# Patient Record
Sex: MALE | Race: WHITE | NOT HISPANIC OR LATINO | Employment: FULL TIME | URBAN - NONMETROPOLITAN AREA
[De-identification: names, ages, dates, MRNs, and addresses within clinical notes are randomized per-mention and may not be internally consistent; named-entity substitution may affect disease eponyms.]

---

## 2017-01-04 ENCOUNTER — OFFICE VISIT (OUTPATIENT)
Dept: OTOLARYNGOLOGY | Facility: OTHER | Age: 57
End: 2017-01-04
Attending: PHYSICIAN ASSISTANT
Payer: COMMERCIAL

## 2017-01-04 VITALS
SYSTOLIC BLOOD PRESSURE: 122 MMHG | WEIGHT: 229 LBS | HEIGHT: 73 IN | TEMPERATURE: 97.5 F | BODY MASS INDEX: 30.35 KG/M2 | DIASTOLIC BLOOD PRESSURE: 70 MMHG | HEART RATE: 58 BPM

## 2017-01-04 DIAGNOSIS — J31.0 CHRONIC RHINITIS: Primary | ICD-10-CM

## 2017-01-04 DIAGNOSIS — J32.4 CHRONIC PANSINUSITIS: ICD-10-CM

## 2017-01-04 DIAGNOSIS — Z98.890 HISTORY OF NASAL SEPTOPLASTY: ICD-10-CM

## 2017-01-04 DIAGNOSIS — J30.89 PERENNIAL ALLERGIC RHINITIS, UNSPECIFIED ALLERGIC RHINITIS TRIGGER: ICD-10-CM

## 2017-01-04 PROCEDURE — 99213 OFFICE O/P EST LOW 20 MIN: CPT | Performed by: PHYSICIAN ASSISTANT

## 2017-01-04 ASSESSMENT — PAIN SCALES - GENERAL: PAINLEVEL: SEVERE PAIN (6)

## 2017-01-04 NOTE — NURSING NOTE
"Chief Complaint   Patient presents with     Allergies     Pt is here for a f/u scit.       Initial /70 mmHg  Pulse 58  Temp(Src) 97.5  F (36.4  C) (Tympanic)  Ht 6' 1\" (1.854 m)  Wt 229 lb (103.874 kg)  BMI 30.22 kg/m2 Estimated body mass index is 30.22 kg/(m^2) as calculated from the following:    Height as of this encounter: 6' 1\" (1.854 m).    Weight as of this encounter: 229 lb (103.874 kg).  BP completed using cuff size: ayanna Newman LPN      "

## 2017-01-04 NOTE — PATIENT INSTRUCTIONS
Continue with nasonex 2 sprays to each nostril daily  You have completed allergy injections    Use sylvia Med rinse- Rinse as needed  If there are concerns or questions, Call [phone] and ask for nurse

## 2017-01-04 NOTE — MR AVS SNAPSHOT
"              After Visit Summary   1/4/2017    Jude Zazueta    MRN: [id number]           Patient Information     Date Of Birth          1960        Visit Information        Provider Department      1/4/2017 8:15 AM Denice Narayan [facility]C [facility]        Today's Diagnoses     Chronic rhinitis    -  1     Perennial allergic rhinitis, unspecified allergic rhinitis trigger         Chronic pansinusitis         History of nasal septoplasty           Care Instructions    Continue with nasonex 2 sprays to each nostril daily  You have completed allergy injections    Use sylvia Med rinse- Rinse as needed  If there are concerns or questions, Call [phone] and ask for nurse        Follow-ups after your visit        Follow-up notes from your care team     Return if symptoms worsen or fail to improve.      Who to contact     If you have questions or need follow up information about today's clinic visit or your schedule please contact [facility] directly at [phone].  Normal or non-critical lab and imaging results will be communicated to you by MyChart, letter or phone within 4 business days after the clinic has received the results. If you do not hear from us within 7 days, please contact the clinic through MyChart or phone. If you have a critical or abnormal lab result, we will notify you by phone as soon as possible.  Submit refill requests through [employer] or call your pharmacy and they will forward the refill request to us. Please allow 3 business days for your refill to be completed.          Additional Information About Your Visit        MyChart Information     [employer] lets you send messages to your doctor, view your test results, renew your prescriptions, schedule appointments and more. To sign up, go to [email or web address] . Click on \"Log in\" on the left side of the screen, which will take you to the Welcome page. Then click on \"Sign up Now\" on the right side of the page.     You " "will be asked to enter the access code listed below, as well as some personal information. Please follow the directions to create your username and password.     Your access code is: [email or web address]  Expires: 2017  8:21 AM     Your access code will  in 90 days. If you need help or a new code, please call your [facility] clinic or [phone].        Care EveryWhere ID     This is your Care EveryWhere ID. This could be used by other organizations to access your [facility] medical records  [id number]        Your Vitals Were     Pulse Temperature Height BMI (Body Mass Index)          58 97.5  F (36.4  C) (Tympanic) 6' 1\" (1.854 m) 30.22 kg/m2         Blood Pressure from Last 3 Encounters:   17 122/70   16 148/72   09/22/15 126/80    Weight from Last 3 Encounters:   17 229 lb (103.874 kg)   16 260 lb (117.935 kg)   09/22/15 255 lb (115.667 kg)              Today, you had the following     No orders found for display       Primary Care Provider Office Phone # Fax #    DALE Melendez [phone]       [address] [address]        Thank you!     Thank you for choosing [facility]  for your care. Our goal is always to provide you with excellent care. Hearing back from our patients is one way we can continue to improve our services. Please take a few minutes to complete the written survey that you may receive in the mail after your visit with us. Thank you!             Your Updated Medication List - Protect others around you: Learn how to safely use, store and throw away your medicines at www.disposemymeds.org.          This list is accurate as of: 17  8:21 AM.  Always use your most recent med list.                   Brand Name Dispense Instructions for use    ALLERGEN IMMUNOTHERAPY PRESCRIPTION     5 mL    Continue with every other week injections until 2016.  Then, advance to monthly allergy injections x 6 " months.       amLODIPine 10 MG tablet    NORVASC     Take 10 mg by mouth daily       EPINEPHrine 0.3 MG/0.3ML injection     2 each    Inject into the muscle as directed for anaphylaxis.       loratadine 10 MG ODT tab    CLARITIN REDITABS     Take 10 mg by mouth daily       losartan 100 MG tablet    COZAAR     Take 100 mg by mouth daily       mometasone 50 MCG/ACT spray    NASONEX     Spray 2 sprays into both nostrils daily       simvastatin 80 MG tablet    ZOCOR     Take 0.5 tablets by mouth every evening.       tamsulosin 0.4 MG capsule    FLOMAX     Take 0.4 mg by mouth daily

## 2017-01-04 NOTE — PROGRESS NOTES
"Chief Complaint   Patient presents with     Allergies     Pt is here for a f/u scit.       Abram presents for follow up regarding allergy injections. He has been completing monthly injections for the last few months. Reports no concerns. Tolerating well.   He has been doing fair. Reports symptoms improved with allergy injections. He uses Nasonex for his nasal congestion. He is breathing well today.     He reports that his breathing is not as well as it was following surgery. He [facility] Sept[facility]/ [facility]. This was completed in 2013?.          Past Medical History   Diagnosis Date     HTN (hypertension)         Allergies   Allergen Reactions     Lisinopril Cough     Nifedipine Swelling     Ankle swelling     Current Outpatient Prescriptions   Medication     ORDER FOR ALLERGEN IMMUNOTHERAPY     mometasone (NASONEX) 50 MCG/ACT nasal spray     amLODIPine (NORVASC) 10 MG tablet     loratadine (CLARITIN REDITABS) 10 MG dispersible tablet     losartan (COZAAR) 100 MG tablet     tamsulosin (FLOMAX) 0.4 MG 24 hr capsule     EPINEPHrine (EPIPEN) 0.3 MG/0.3ML injection     simvastatin (ZOCOR) 80 MG tablet     [DISCONTINUED] ALLERGY INJECTIONS     No current facility-administered medications for this visit.    ROS: 10 point ROS neg other than the symptoms noted above in the HPI.  /70 mmHg  Pulse 58  Temp(Src) 97.5  F (36.4  C) (Tympanic)  Ht 6' 1\" (1.854 m)  Wt 229 lb (103.874 kg)  BMI 30.22 kg/m2  General Appearance: healthy, alert, active and no distress  Head: Normocephalic. No masses, lesions, tenderness or abnormalities  Eyes: conjuctiva clear, PERRL, EOM intact  Ears: External ears normal. Canals clear. TM's normal.  Nose: Nares normal  Mouth: normal  Neck: Supple, no cervical adenopathy, no thyromegaly, Full range of motion in all planes      The lips appear normal and without lesion. The dentition is in good condition The patient has a normal appearing and functioning hard and " soft palate without bifid uvula or submucosal cleft. The tongue is normal in appearance without erosive lesion or fungating mass. The oral mucosa is soft and normal in appearance. The patient also has a soft floor of mouth and base of tongue.       ASSESSMENT:    ICD-10-CM    1. Chronic rhinitis J31.0    2. Perennial allergic rhinitis, unspecified allergic rhinitis trigger J30.89    3. Chronic pansinusitis J32.4    4. History of nasal septoplasty Z98.890            Use oral antihistamines PRN  Use nasonex 2 sprays to each nostril daily.    If congestion worsens, contact clinic. May consider TR.     Completed SCIT  Follow up PRN      Denice Narayan PA-C  ENT  [facility] [place]  [phone]

## 2017-05-17 LAB
CHOLESTEROL (EXTERNAL): 129 MG/DL
HDLC SERPL-MCNC: 46 MG/DL
LDL CHOLESTEROL CALCULATED (EXTERNAL): 68 MG/DL
NON HDL CHOLESTEROL (EXTERNAL): 83 MG/DL
TRIGLYCERIDES (EXTERNAL): 73 MG/DL (ref 0–150)

## 2018-06-21 LAB
ALT SERPL-CCNC: 34 U/L (ref 13–61)
AST SERPL-CCNC: 26 U/L (ref 15–37)
CHOLESTEROL (EXTERNAL): 101 MG/DL
CREATININE (EXTERNAL): 0.8 MG/DL (ref 0.7–1.2)
CREATININE (EXTERNAL): 0.8 MG/DL (ref 0.7–1.2)
GFR ESTIMATED (EXTERNAL): >60 ML/MIN/1.73M2
GFR ESTIMATED (EXTERNAL): >60 ML/MIN/1.73M2
GLUCOSE (EXTERNAL): 113 MG/DL (ref 74–106)
HBA1C MFR BLD: 5.2 % (ref 4–6)
HDLC SERPL-MCNC: 39 MG/DL
LDL CHOLESTEROL CALCULATED (EXTERNAL): 47 MG/DL
NON HDL CHOLESTEROL (EXTERNAL): 62 MG/DL
POTASSIUM (EXTERNAL): 2.9 MMOL/L (ref 3.5–5)
TRIGLYCERIDES (EXTERNAL): 75 MG/DL (ref 0–150)
TSH SERPL-ACNC: <0.01 UIU/ML (ref 0.3–5)

## 2018-08-17 ENCOUNTER — HOSPITAL ENCOUNTER (OUTPATIENT)
Dept: CT IMAGING | Facility: HOSPITAL | Age: 58
End: 2018-08-17
Attending: EMERGENCY MEDICINE
Payer: COMMERCIAL

## 2018-08-17 ENCOUNTER — HOSPITAL ENCOUNTER (OUTPATIENT)
Dept: CT IMAGING | Facility: HOSPITAL | Age: 58
Discharge: HOME | End: 2018-08-17
Attending: EMERGENCY MEDICINE | Admitting: FAMILY MEDICINE
Payer: COMMERCIAL

## 2018-08-17 DIAGNOSIS — R13.12 DYSPHAGIA, OROPHARYNGEAL PHASE: ICD-10-CM

## 2018-08-17 PROCEDURE — 25000128 H RX IP 250 OP 636: Performed by: RADIOLOGY

## 2018-08-17 PROCEDURE — 71260 CT THORAX DX C+: CPT | Mod: TC

## 2018-08-17 PROCEDURE — 70491 CT SOFT TISSUE NECK W/DYE: CPT | Mod: TC

## 2018-08-17 PROCEDURE — 70486 CT MAXILLOFACIAL W/O DYE: CPT | Mod: TC

## 2018-08-17 RX ORDER — IOPAMIDOL 612 MG/ML
100 INJECTION, SOLUTION INTRAVASCULAR ONCE
Status: COMPLETED | OUTPATIENT
Start: 2018-08-17 | End: 2018-08-17

## 2018-08-17 RX ORDER — IOPAMIDOL 612 MG/ML
50 INJECTION, SOLUTION INTRAVASCULAR ONCE
Status: COMPLETED | OUTPATIENT
Start: 2018-08-17 | End: 2018-08-17

## 2018-08-17 RX ADMIN — IOPAMIDOL 100 ML: 612 INJECTION, SOLUTION INTRAVENOUS at 12:13

## 2018-08-17 RX ADMIN — IOPAMIDOL 50 ML: 612 INJECTION, SOLUTION INTRAVENOUS at 12:13

## 2018-09-12 ENCOUNTER — HOSPITAL ENCOUNTER (OUTPATIENT)
Dept: CARDIOLOGY | Facility: HOSPITAL | Age: 58
Discharge: HOME | End: 2018-09-12
Attending: EMERGENCY MEDICINE | Admitting: EMERGENCY MEDICINE
Payer: COMMERCIAL

## 2018-09-12 DIAGNOSIS — R60.0 LOWER EXTREMITY EDEMA: ICD-10-CM

## 2018-09-12 DIAGNOSIS — J90 PLEURAL EFFUSION, BILATERAL: ICD-10-CM

## 2018-09-12 DIAGNOSIS — I48.91 NEW ONSET A-FIB (H): ICD-10-CM

## 2018-09-12 PROCEDURE — 93306 TTE W/DOPPLER COMPLETE: CPT | Mod: 26 | Performed by: INTERNAL MEDICINE

## 2018-09-12 PROCEDURE — 93306 TTE W/DOPPLER COMPLETE: CPT | Mod: TC

## 2018-09-17 ENCOUNTER — APPOINTMENT (OUTPATIENT)
Dept: CT IMAGING | Facility: HOSPITAL | Age: 58
End: 2018-09-17
Attending: NURSE PRACTITIONER
Payer: OTHER MISCELLANEOUS

## 2018-09-17 ENCOUNTER — HOSPITAL ENCOUNTER (EMERGENCY)
Facility: HOSPITAL | Age: 58
Discharge: HOME | End: 2018-09-17
Attending: NURSE PRACTITIONER | Admitting: NURSE PRACTITIONER
Payer: OTHER MISCELLANEOUS

## 2018-09-17 VITALS
TEMPERATURE: 99.6 F | OXYGEN SATURATION: 96 % | SYSTOLIC BLOOD PRESSURE: 163 MMHG | RESPIRATION RATE: 16 BRPM | HEART RATE: 98 BPM | DIASTOLIC BLOOD PRESSURE: 99 MMHG

## 2018-09-17 DIAGNOSIS — G44.319 ACUTE POST-TRAUMATIC HEADACHE, NOT INTRACTABLE: ICD-10-CM

## 2018-09-17 PROCEDURE — 99213 OFFICE O/P EST LOW 20 MIN: CPT | Performed by: NURSE PRACTITIONER

## 2018-09-17 PROCEDURE — 70450 CT HEAD/BRAIN W/O DYE: CPT | Mod: TC

## 2018-09-17 PROCEDURE — G0463 HOSPITAL OUTPT CLINIC VISIT: HCPCS

## 2018-09-17 PROCEDURE — 72125 CT NECK SPINE W/O DYE: CPT | Mod: TC

## 2018-09-17 ASSESSMENT — ENCOUNTER SYMPTOMS
CHILLS: 0
APPETITE CHANGE: 0
LIGHT-HEADEDNESS: 0
NAUSEA: 0
FEVER: 0
DIZZINESS: 0
FATIGUE: 0
FACIAL ASYMMETRY: 0
WEAKNESS: 0
NUMBNESS: 0
HEADACHES: 1
DECREASED CONCENTRATION: 0
SPEECH DIFFICULTY: 0
CONFUSION: 0

## 2018-09-17 NOTE — ED AVS SNAPSHOT
[facility]    [address] [address]    Phone:  [phone]                                       Jude Zazueta   MRN: [id number]    Department:  [facility]   Date of Visit:  9/17/2018           After Visit Summary Signature Page     I have received my discharge instructions, and my questions have been answered. I have discussed any challenges I see with this plan with the nurse or doctor.    ..........................................................................................................................................  Patient/Patient Representative Signature      ..........................................................................................................................................  Patient Representative Print Name and Relationship to Patient    ..................................................               ................................................  Date                                   Time    ..........................................................................................................................................  Reviewed by Signature/Title    ...................................................              ..............................................  Date                                               Time          22EPIC Rev 08/18

## 2018-09-17 NOTE — ED AVS SNAPSHOT
[facility]    [address] [address]    Phone:  [phone]                                       Jude Zazueta   MRN: [id number]    Department:  [facility]   Date of Visit:  9/17/2018           Patient Information     Date Of Birth          1960        Your diagnoses for this visit were:     Acute post-traumatic headache, not intractable        You were seen by Eleanor Oquendo NP.      Follow-up Information     Follow up with [facility].    Specialty:  EMERGENCY MEDICINE    Why:  As needed, If symptoms worsen, or concerns develop    Contact information:    [address] [place] [address] [phone]    Additional information:    From [place]: Take [address]. Turn left at [address]. Turn left at the first stoplight on [address] [place] [address]. At the first stop sign, take a right onto [address]. Take a left into the parking lot and continue through until you reach the North enterance of the building.       From [place]: Take [address]. Take the [address] ramp towards [place]. Turn left onto [address]. Take a slight right onto [address]. Turn left at the first stoplight on [address]. At the first stop sign, take a right onto [address]. Take a left into the parking lot and continue through until you reach the North enterance of the building.       From [place]: Take [address]. Take a right at [address]. At the first stop sign, take a right onto [address]. Take a left into the parking lot and continue through until you reach the North enterance of the building.         Follow up with Tia Cat PA.    Specialty:  Family Practice    Why:  As needed, if symptoms do not improve    Contact information:    [address]  [place] [address]  [phone]        Discharge References/Attachments     HEAD INJURY, NO WAKE-UP  (ADULT) (ENGLISH)    CONCUSSION (ENGLISH)         Review of your medicines      Our records show that you are taking the medicines listed below. If these are incorrect, please call your family doctor or clinic.        Dose / Directions Last dose taken    amLODIPine 10 MG tablet   Commonly known as:  NORVASC   Dose:  10 mg   Indication:  High Blood Pressure Disorder        Take 10 mg by mouth daily   Refills:  0        ELIQUIS PO        Refills:  0        LASIX PO        Refills:  0        loratadine 10 MG ODT tab   Commonly known as:  CLARITIN REDITABS   Dose:  10 mg   Indication:  Perennial Allergic Rhinitis        Take 10 mg by mouth daily   Refills:  0        losartan 100 MG tablet   Commonly known as:  COZAAR   Dose:  100 mg        Take 100 mg by mouth daily   Refills:  0        mometasone 50 MCG/ACT spray   Commonly known as:  NASONEX   Dose:  2 spray   Indication:  Perennial Allergic Rhinitis, Hayfever, Sinus Irritation and Congestion        Spray 2 sprays into both nostrils daily   Refills:  0        ORDER FOR ALLERGEN IMMUNOTHERAPY 5 mL vial   Quantity:  5 mL        Continue with every other week injections until Fall 2016.  Then, advance to monthly allergy injections x 6 months.   Refills:  0        tamsulosin 0.4 MG capsule   Commonly known as:  FLOMAX   Dose:  0.4 mg        Take 0.4 mg by mouth daily   Refills:  0                Procedures and tests performed during your visit     Cervical spine CT w/o contrast    Head CT w/o contrast      Orders Needing Specimen Collection     None      Pending Results     No orders found from 9/15/2018 to 9/18/2018.            Pending Culture Results     No orders found from 9/15/2018 to 9/18/2018.            Thank you for choosing [facility]       Thank you for choosing [facility] for your care. Our goal is always to provide you with excellent care. Hearing back from our patients is one way we can continue to improve our services. Please take a few minutes to complete the  "written survey that you may receive in the mail after you visit with us. Thank you!        Take the [employer] Information     [employer] lets you send messages to your doctor, view your test results, renew your prescriptions, schedule appointments and more. To sign up, go to [email or web address] . Click on \"Log in\" on the left side of the screen, which will take you to the Welcome page. Then click on \"Sign up Now\" on the right side of the page.     You will be asked to enter the access code listed below, as well as some personal information. Please follow the directions to create your username and password.     Your access code is: [email or web address]  Expires: 2018  9:00 PM     Your access code will  in 90 days. If you need help or a new code, please call your [facility] clinic or [phone].        Care EveryWhere ID     This is your Care EveryWhere ID. This could be used by other organizations to access your [facility] medical records  [id number]        Equal Access to Services     SANIA TANNER : Hadii tameka schultzo Sotraci, waaxda luqadaha, qaybta kaalmaelisha adepavan, arlene dillon . So [facility] [phone].    ATENCIÓN: Si habla español, tiene a james disposición servicios gratuitos de asistencia lingüística. Llame al [phone].    We comply with applicable federal civil rights laws and [place] laws. We do not discriminate on the basis of race, color, national origin, age, disability, sex, sexual orientation, or gender identity.            After Visit Summary       This is your record. Keep this with you and show to your community pharmacist(s) and doctor(s) at your next visit.                  "

## 2018-09-18 NOTE — ED TRIAGE NOTES
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 Concerned due to onset of headache today and pt started eliquis almost 2 weeks ago. Pt took 2 tylenol this am.
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
## 2018-09-28 LAB — TSH SERPL-ACNC: <0.01 UIU/ML (ref 0.3–5)

## 2018-10-02 ENCOUNTER — TELEPHONE (OUTPATIENT)
Dept: CARDIOLOGY | Facility: OTHER | Age: 58
End: 2018-10-02

## 2018-10-02 NOTE — TELEPHONE ENCOUNTER
Called patient to schedule his [facility] approved appointment with cardiology. Patient wasn't sure if he was going to be [facility] [place] or come up to our facility for cardiology appointment. Records got sent down to [facility] for scanning for any future appointments with us. Patient will contact us to schedule this appointment if he changes his mind.

## 2018-10-10 NOTE — TELEPHONE ENCOUNTER
Danyel at [facility] he sent a referral over for Afib he has been requested to resend that to [facility] please shred this documentation for the [place].

## 2018-10-30 LAB — TSH SERPL-ACNC: <0.01 UIU/ML (ref 0.3–5)

## 2018-12-26 LAB — TSH SERPL-ACNC: <0.01 UIU/ML (ref 0.3–5)

## 2019-01-23 LAB — TSH SERPL-ACNC: <0.01 UIU/ML (ref 0.3–5)

## 2019-02-20 LAB — TSH SERPL-ACNC: <0.01 UIU/ML (ref 0.3–5)

## 2019-03-27 LAB — TSH SERPL-ACNC: <0.01 UIU/ML (ref 0.3–5)

## 2019-05-03 LAB — TSH SERPL-ACNC: <0.01 UIU/ML (ref 0.3–5)

## 2019-06-12 LAB — TSH SERPL-ACNC: <0.01 UIU/ML (ref 0.35–4.94)

## 2019-06-21 LAB
ALT SERPL-CCNC: 27 U/L (ref 13–61)
AST SERPL-CCNC: 25 U/L (ref 15–37)
CHOLESTEROL (EXTERNAL): 107 MG/DL
CREATININE (EXTERNAL): 0.9 MG/DL (ref 0.7–1.2)
CREATININE (EXTERNAL): 0.9 MG/DL (ref 0.7–1.2)
GFR ESTIMATED (EXTERNAL): >60 ML/MIN/1.73M2
GFR ESTIMATED (EXTERNAL): >60 ML/MIN/1.73M2
GLUCOSE (EXTERNAL): 114 MG/DL (ref 74–106)
HDLC SERPL-MCNC: 29 MG/DL
LDL CHOLESTEROL CALCULATED (EXTERNAL): 68 MG/DL
NON HDL CHOLESTEROL (EXTERNAL): 78 MG/DL
POTASSIUM (EXTERNAL): 3.5 MMOL/L (ref 3.5–5)
TRIGLYCERIDES (EXTERNAL): 48 MG/DL (ref 0–150)

## 2019-10-15 LAB
TSH SERPL-ACNC: 1.02 UIU/ML (ref 0.35–4.94)
TSH SERPL-ACNC: 1.02 UIU/ML (ref 0.35–4.94)

## 2020-02-19 ENCOUNTER — TRANSFERRED RECORDS (OUTPATIENT)
Dept: HEALTH INFORMATION MANAGEMENT | Facility: CLINIC | Age: 60
End: 2020-02-19

## 2020-02-19 LAB
TSH SERPL-ACNC: 2.05 UIU/ML (ref 0.35–4.94)
TSH SERPL-ACNC: 2.05 UIU/ML (ref 0.35–4.94)

## 2020-08-13 LAB
ALT SERPL-CCNC: 14 U/L (ref 13–61)
ALT SERPL-CCNC: 14 U/L (ref 13–61)
AST SERPL-CCNC: 19 U/L (ref 15–37)
AST SERPL-CCNC: 19 U/L (ref 15–37)
CHOLESTEROL (EXTERNAL): 140 MG/DL
CHOLESTEROL (EXTERNAL): 140 MG/DL
CREATININE (EXTERNAL): 1 MG/DL (ref 0.7–1.2)
CREATININE (EXTERNAL): 1 MG/DL (ref 0.7–1.2)
GFR ESTIMATED (EXTERNAL): >60 ML/MIN/1.73M2
GFR ESTIMATED (EXTERNAL): >60 ML/MIN/1.73M2
HDLC SERPL-MCNC: 45 MG/DL
HDLC SERPL-MCNC: 45 MG/DL
LDL CHOLESTEROL CALCULATED (EXTERNAL): 83 MG/DL
LDL CHOLESTEROL CALCULATED (EXTERNAL): 83 MG/DL
NON HDL CHOLESTEROL (EXTERNAL): 95 MG/DL
NON HDL CHOLESTEROL (EXTERNAL): 95 MG/DL
TRIGLYCERIDES (EXTERNAL): 58 MG/DL
TRIGLYCERIDES (EXTERNAL): 58 MG/DL (ref 0–150)
TSH SERPL-ACNC: 3.1 UIU/ML (ref 0.35–4.94)
TSH SERPL-ACNC: 3.1 UIU/ML (ref 0.35–4.94)

## 2021-01-15 ENCOUNTER — TRANSFERRED RECORDS (OUTPATIENT)
Dept: HEALTH INFORMATION MANAGEMENT | Facility: CLINIC | Age: 61
End: 2021-01-15

## 2021-06-03 ENCOUNTER — TRANSFERRED RECORDS (OUTPATIENT)
Dept: HEALTH INFORMATION MANAGEMENT | Facility: CLINIC | Age: 61
End: 2021-06-03

## 2021-06-03 LAB
ALT SERPL-CCNC: 17 U/L (ref 13–61)
ALT SERPL-CCNC: 17 U/L (ref 13–61)
AST SERPL-CCNC: 20 U/L (ref 15–37)
AST SERPL-CCNC: 20 U/L (ref 15–37)
CREATININE (EXTERNAL): 0.9 MG/DL (ref 0.7–1.2)
CREATININE (EXTERNAL): 0.9 MG/DL (ref 0.7–1.2)
GFR ESTIMATED (EXTERNAL): >60 ML/MIN/1.73M2
GFR ESTIMATED (EXTERNAL): >60 ML/MIN/1.73M2
TSH SERPL-ACNC: 5.46 UIU/ML (ref 0.35–4.94)
TSH SERPL-ACNC: 5.46 UIU/ML (ref 0.35–4.94)

## 2021-06-15 ENCOUNTER — TRANSCRIBE ORDERS (OUTPATIENT)
Dept: OTHER | Age: 61
End: 2021-06-15

## 2021-06-15 DIAGNOSIS — E05.00 THYROTOXICOSIS WITH DIFFUSE GOITER WITHOUT THYROTOXIC CRISIS OR STORM: Primary | ICD-10-CM

## 2021-07-21 ENCOUNTER — TRANSFERRED RECORDS (OUTPATIENT)
Dept: HEALTH INFORMATION MANAGEMENT | Facility: CLINIC | Age: 61
End: 2021-07-21

## 2021-07-23 NOTE — PROGRESS NOTES
Assessment & Plan     Graves' disease with exophthalmos  Will get records. Has regular follow up on thyroid with labs. Already has optho appt     Atrial fibrillation, unspecified type (H)  Rate controlled. On Eliquis which is tolerated well. S/p cardioversion.     Malignant neoplasm of prostate (H)  S/p brachytherapy, ongoing LUTs that are under fair control with flomax.     Essential hypertension / Bilateral leg edema  BP on the high side today. [facility] with [facility] Recheck at that time. Consider taper of norvasc to see if this helps with edema an adding aldactone (already on fairly significant doses of lasix).     MIRIAM (obstructive sleep apnea)  ON CPAP.     Gastroesophageal reflux disease with esophagitis, unspecified whether hemorrhage  Decrease to 20mg daily of ppi.     35 minutes spent on the date of the encounter doing chart review, review of outside records, interpretation of tests, patient visit and documentation     Return in about 2 months (around 9/27/2021) for BP Recheck, Thyroid, Gerd.    Rosmery Daly MD  [facility] - DENILSON Roque is a 61 year old who presents for the following health issues  accompanied by his spouse:    HPI     New Patient/Transfer of Care    [facility] not available for review.     Hyperthyroidism Follow-up      Since last visit, patient describes the following symptoms: Weight stable, no hair loss, no skin changes, no constipation, no loose stools    [facility]. Has optho appt upcoming due to exopthalmos    Hypertension Follow-up      Do you check your blood pressure regularly outside of the clinic? Yes     Are you following a low salt diet? Yes    Are your blood pressures ever more than 140 on the top number (systolic) OR more   than 90 on the bottom number (diastolic), for example 140/90? Yes    Mildly elevated today. History also of a fib. Rate controlled. LE swelling, bilateral.  "Chronic. Wondering about medications for this.     History of GERD/Hiatal hernia. Taking 40mg of prilosec a day. Asymptomatic currently.     History of Prostate cancer s/p brachytherapy. On flomax which is helpful for ugency/frequency.     Review of Systems   Constitutional, HEENT, cardiovascular, pulmonary, gi and gu systems are negative, except as otherwise noted.      Objective    BP (!) 142/84 (Patient Position: Sitting)   Pulse 77   Ht 1.854 m (6' 1\")   Wt 113.4 kg (250 lb)   SpO2 95%   BMI 32.98 kg/m    Body mass index is 32.98 kg/m .  Physical Exam   GENERAL: alert and no distress  EYES: mild proptosis  NECK: no adenopathy, no asymmetry, masses, or scars and thyroid normal to palpation  RESP: lungs clear to auscultation - no rales, rhonchi or wheezes  CV: regular rates and rhythm, normal S1 S2, no S3 or S4, no murmur, click or rub and 1+ bilateral lower extremity pitting edema to knees    ABDOMEN: soft, nontender, no hepatosplenomegaly, no masses and bowel sounds normal  MS: extremities normal- no gross deformities noted  SKIN: no suspicious lesions or rashes  NEURO: Normal strength and tone, mentation intact and speech normal  PSYCH: mentation appears normal, affect normal/bright    No results found for any visits on 07/27/21.      "

## 2021-07-27 ENCOUNTER — OFFICE VISIT (OUTPATIENT)
Dept: FAMILY MEDICINE | Facility: OTHER | Age: 61
End: 2021-07-27
Attending: FAMILY MEDICINE
Payer: COMMERCIAL

## 2021-07-27 VITALS
BODY MASS INDEX: 33.13 KG/M2 | DIASTOLIC BLOOD PRESSURE: 84 MMHG | HEART RATE: 77 BPM | HEIGHT: 73 IN | WEIGHT: 250 LBS | SYSTOLIC BLOOD PRESSURE: 142 MMHG | OXYGEN SATURATION: 95 %

## 2021-07-27 DIAGNOSIS — R60.0 BILATERAL LEG EDEMA: ICD-10-CM

## 2021-07-27 DIAGNOSIS — K21.00 GASTROESOPHAGEAL REFLUX DISEASE WITH ESOPHAGITIS, UNSPECIFIED WHETHER HEMORRHAGE: ICD-10-CM

## 2021-07-27 DIAGNOSIS — E05.00 GRAVES' DISEASE WITH EXOPHTHALMOS: Primary | ICD-10-CM

## 2021-07-27 DIAGNOSIS — I10 ESSENTIAL HYPERTENSION: ICD-10-CM

## 2021-07-27 DIAGNOSIS — I48.91 ATRIAL FIBRILLATION, UNSPECIFIED TYPE (H): ICD-10-CM

## 2021-07-27 DIAGNOSIS — C61 MALIGNANT NEOPLASM OF PROSTATE (H): ICD-10-CM

## 2021-07-27 DIAGNOSIS — G47.33 OSA (OBSTRUCTIVE SLEEP APNEA): ICD-10-CM

## 2021-07-27 PROBLEM — M25.561 CHRONIC PAIN OF RIGHT KNEE: Status: ACTIVE | Noted: 2017-11-30

## 2021-07-27 PROBLEM — M62.81 MUSCLE WEAKNESS: Status: ACTIVE | Noted: 2017-11-30

## 2021-07-27 PROBLEM — G89.29 CHRONIC PAIN OF RIGHT KNEE: Status: ACTIVE | Noted: 2017-11-30

## 2021-07-27 PROCEDURE — 99203 OFFICE O/P NEW LOW 30 MIN: CPT | Performed by: FAMILY MEDICINE

## 2021-07-27 RX ORDER — ATORVASTATIN CALCIUM 40 MG/1
40 TABLET, FILM COATED ORAL DAILY
COMMUNITY
Start: 2021-07-15

## 2021-07-27 RX ORDER — FUROSEMIDE 40 MG
40 TABLET ORAL DAILY
COMMUNITY
Start: 2021-07-15

## 2021-07-27 RX ORDER — FLUTICASONE PROPIONATE 50 MCG
SPRAY, SUSPENSION (ML) NASAL
COMMUNITY
Start: 2021-06-27 | End: 2023-08-29

## 2021-07-27 RX ORDER — METOPROLOL TARTRATE 50 MG
TABLET ORAL
COMMUNITY
Start: 2021-07-15 | End: 2023-01-20

## 2021-07-27 RX ORDER — TRIAMCINOLONE ACETONIDE 1 MG/G
OINTMENT TOPICAL
COMMUNITY
Start: 2021-07-15 | End: 2021-10-08

## 2021-07-27 RX ORDER — APIXABAN 5 MG/1
5 TABLET, FILM COATED ORAL 2 TIMES DAILY
COMMUNITY
Start: 2021-06-03

## 2021-07-27 RX ORDER — OMEPRAZOLE 40 MG/1
40 CAPSULE, DELAYED RELEASE ORAL DAILY
COMMUNITY
End: 2021-07-27

## 2021-07-27 RX ORDER — METHIMAZOLE 5 MG/1
TABLET ORAL
COMMUNITY
Start: 2021-07-09

## 2021-07-27 ASSESSMENT — MIFFLIN-ST. JEOR: SCORE: 1987.87

## 2021-07-27 ASSESSMENT — PAIN SCALES - GENERAL: PAINLEVEL: NO PAIN (0)

## 2021-07-27 NOTE — NURSING NOTE
"Chief Complaint   Patient presents with     Establish Care       Initial BP (!) 142/84 (Patient Position: Sitting)   Pulse 77   Ht 1.854 m (6' 1\")   Wt 113.4 kg (250 lb)   SpO2 95%   BMI 32.98 kg/m   Estimated body mass index is 32.98 kg/m  as calculated from the following:    Height as of this encounter: 1.854 m (6' 1\").    Weight as of this encounter: 113.4 kg (250 lb).  Medication Reconciliation: complete  Анна Anderson LPN  "

## 2021-08-03 ENCOUNTER — APPOINTMENT (OUTPATIENT)
Dept: CHIROPRACTIC MEDICINE | Facility: OTHER | Age: 61
End: 2021-08-03

## 2021-08-03 ENCOUNTER — APPOINTMENT (OUTPATIENT)
Dept: OCCUPATIONAL MEDICINE | Facility: OTHER | Age: 61
End: 2021-08-03

## 2021-08-03 ENCOUNTER — TRANSFERRED RECORDS (OUTPATIENT)
Dept: HEALTH INFORMATION MANAGEMENT | Facility: CLINIC | Age: 61
End: 2021-08-03

## 2021-08-03 PROCEDURE — 99499 UNLISTED E&M SERVICE: CPT

## 2021-08-06 NOTE — PROGRESS NOTES
There were [facility] the [facility] on 6/[facility], I printed them out and placed them in your box.    All faxes from 7/27 were sent to scanning and they are about 10 days behind so I cannot currently see if he did sign an GERI at this visit at this time.  If you review the notes I printed for you and it is not what you are looking for I can send a fax to the [facility] requesting records.

## 2021-08-25 LAB
ALT SERPL-CCNC: 20 U/L (ref 13–61)
AST SERPL-CCNC: 26 U/L (ref 15–37)
CHOLESTEROL (EXTERNAL): 151 MG/DL
CREATININE (EXTERNAL): 1.3 MG/DL (ref 0.7–1.2)
GFR ESTIMATED (EXTERNAL): 56 ML/MIN/1.73M2
GLUCOSE (EXTERNAL): 110 MG/DL (ref 74–106)
HDLC SERPL-MCNC: 39 MG/DL
LDL CHOLESTEROL CALCULATED (EXTERNAL): 90 MG/DL
NON HDL CHOLESTEROL (EXTERNAL): 112 MG/DL
POTASSIUM (EXTERNAL): 2.9 MMOL/L (ref 3.5–5)
TRIGLYCERIDES (EXTERNAL): 111 MG/DL
TSH SERPL-ACNC: 5.07 UIU/ML (ref 0.35–4.94)

## 2021-08-27 ENCOUNTER — TELEPHONE (OUTPATIENT)
Dept: OPHTHALMOLOGY | Facility: CLINIC | Age: 61
End: 2021-08-27

## 2021-08-30 ENCOUNTER — OFFICE VISIT (OUTPATIENT)
Dept: OPHTHALMOLOGY | Facility: CLINIC | Age: 61
End: 2021-08-30
Attending: STUDENT IN AN ORGANIZED HEALTH CARE EDUCATION/TRAINING PROGRAM
Payer: COMMERCIAL

## 2021-08-30 DIAGNOSIS — E05.00 THYROTOXICOSIS WITH DIFFUSE GOITER WITHOUT THYROTOXIC CRISIS OR STORM: ICD-10-CM

## 2021-08-30 PROCEDURE — 99203 OFFICE O/P NEW LOW 30 MIN: CPT | Mod: GC | Performed by: OPHTHALMOLOGY

## 2021-08-30 PROCEDURE — G0463 HOSPITAL OUTPT CLINIC VISIT: HCPCS | Mod: 25 | Performed by: TECHNICIAN/TECHNOLOGIST

## 2021-08-30 PROCEDURE — 92285 EXTERNAL OCULAR PHOTOGRAPHY: CPT | Performed by: OPHTHALMOLOGY

## 2021-08-30 ASSESSMENT — TONOMETRY
IOP_METHOD: SINGLE ICARE
OD_IOP_MMHG: 20
IOP_METHOD: UPGAZE ICARE
OS_IOP_MMHG: 22
OD_IOP_MMHG: 21
OS_IOP_MMHG: 20

## 2021-08-30 ASSESSMENT — SLIT LAMP EXAM - LIDS
COMMENTS: NORMAL
COMMENTS: NORMAL

## 2021-08-30 ASSESSMENT — CUP TO DISC RATIO
OS_RATIO: 0.3
OD_RATIO: 0.3

## 2021-08-30 ASSESSMENT — REFRACTION_MANIFEST
OD_SPHERE: +0.25
OD_AXIS: 080
OD_CYLINDER: +0.25

## 2021-08-30 ASSESSMENT — REFRACTION_WEARINGRX
OS_SPHERE: +3.00
OS_CYLINDER: SPHERE
OD_SPHERE: +3.00
SPECS_TYPE: OTC READERS
OD_CYLINDER: SPHERE

## 2021-08-30 ASSESSMENT — CONF VISUAL FIELD
OS_NORMAL: 1
OD_NORMAL: 1

## 2021-08-30 ASSESSMENT — VISUAL ACUITY
METHOD: SNELLEN - LINEAR
OD_SC: 20/60
OS_SC: 20/20

## 2021-08-30 ASSESSMENT — EXTERNAL EXAM - LEFT EYE: OS_EXAM: PROPTOSIS

## 2021-08-30 ASSESSMENT — EXTERNAL EXAM - RIGHT EYE: OD_EXAM: PROPTOSIS

## 2021-08-30 NOTE — PROGRESS NOTES
Assessment & Plan     HPI:  Two years ago patient first developed symptoms of sore throat and was found to have thyrotoxicosis with diffuse goiter and without thyrotoxic crisis or storm. Was subsequently diagnosed with Graves Disease. Treatment with methimazole was started and has gradually been weaned to 5mg TID which is what he takes now. At the time of diagnosis had proptosis of left eye which his P[facility], [facility], felt would decrease with time but it has not improved. Also endorses dryness in both eyes for which he uses AT 2-3 times per day and AT ointment at bedtime, with minimal improvement. Patient's biggest concern is decreasing asymmetric proptosis in left eye compared to right.    [facility]erring [facility]    Thyroid history:  Diagnosed when? 2018  LAL: None  Thyroidectomy: None    TSI (date): none          Previous results:  02/19/2020: CT scan of orbits: bilateral symmetric 4mm proptosis with normal extraocular musculature size. Suspect increase in intraorbital fat.   06/03/2021: TSH 5.46     08/25/2021: Labs performed at [facility] (not available)     03/2019:       T4, Free 0.7 - 1.7 ng/dL 1.1           Thyroid Stimulating Hormone 0.40 - 3.99 uIU/mL <0.01Low         Component 02/20/19 10/30/18 06/21/18   EXTERNAL T4, FREE 1.18 6.56Abnormal   2.46Abnormal      Component 02/20/19 10/30/18 06/21/18   EXTERNAL TSH 0.01Abnormal  <0.01Abnormal  <0.01Abnormal        Eye symptoms (since when):   Proptosis (better/worse/same since last visit):   Present (stable)  Diplopia(better/worse/same since last visit):    None (stable)  Eyelid retraction(better/worse/same since last visit):  None (stable)  Tearing(better/worse/same since last visit):    Daily (stable)  Redness (better/worse/same since last visit):   Constant (stable)  Pain ((better/worse/same since last visit):    Left eye (stable)  Pain to move the eyes (better/worse/same since last visit): Left eye (stable)  Blurred vision:  Pharmacokinetic Consult to Pharmacist 
 
Cici Toro is a 53 y.o. male being treated for CAP with vancomycin. Height: 6' 5\" (195.6 cm)  Weight: 55.5 kg (122 lb 5.7 oz) Lab Results Component Value Date/Time BUN 53 (H) 07/31/2019 03:58 AM  
 Creatinine 1.70 (H) 07/31/2019 03:58 AM  
 WBC 6.8 07/31/2019 03:58 AM  
 Procalcitonin 0.7 07/25/2019 12:00 PM  
 Lactic Acid (POC) 1.82 07/21/2019 08:38 AM  
  
Estimated Creatinine Clearance: 38.5 mL/min (A) (based on SCr of 1.7 mg/dL (H)). Lab Results Component Value Date/Time Vancomycin,trough 21.3 (HH) 07/29/2019 09:48 PM  
 Vancomycin, random 19.9 07/31/2019 03:58 AM  
 
 
Day 6 of vancomycin. Goal trough is 15-20. Random level this AM of 19.9 - re dosed with 1000 mg x 1, will schedule 750 mg q 24 hours and monitor renal function closely. Further levels will be ordered as clinically indicated. Pharmacy will continue to follow. Please call with any questions. Thank you, Iván Aponte, PharmD Clinical Pharmacist 
[phone]       When driving right eye (stable)    Ocular history:   Orbital decompression (date, details): none  Strabismus surgery (date, details): none  Eyelid surgery (date, details): none    Exam:   Joan : 108/25/27    Strabismus (better/worse/same): abduction deficit, none in primary gaze  Eyelid retraction (better/worse/same): none    1. Spontaneous orbital pain.     0  2. Gaze evoked orbital pain.     1  3. Eyelid swelling due to active thyroid eye disease  0  4. Eyelid erythema.      0  5. Conjunctival redness due to active thyroid eye disease . 1  6. Chemosis.        0  7. Inflammation of caruncle OR plica.   0    SHERIE SCORE = 2    Jude Zazueta is a 61 year old male with the following diagnoses:   1. Thyrotoxicosis with diffuse goiter without thyrotoxic crisis or storm      Thyroid eye disease (RICHIE).  The natural history of thyroid eye disease was discussed. We told the patient that typically RICHIE will worsen for a period of time, then improve to some degree, and then stabilize without normalizing.  This process can take somewhere between 1 and 3 years on average.  In the meantime, we recommended seeing the patient in the [facility] every 6 months (sooner if the patient experiences worsening vision in either eye).    Finally, we discussed that correcting the thyroid hormone levels does not ensure that the eyes will normalize but that it could help to some degree.       We discussed orbital decompression with the patient since his SHERIE score is so low.  We scheduled the patient for follow up in 4 months to make sure he remains quiescent.  Discuss with Dr. Marok Ledezma and Marques Angelo MD.             Attending Physician Attestation:  Complete documentation of historical and exam elements from today's encounter can be found in the full encounter summary report (not reduplicated in this progress note).  I personally obtained the chief complaint(s) and history of present illness.  I  confirmed and edited as necessary the review of systems, past medical/surgical history, family history, social history, and examination findings as documented by others; and I examined the patient myself.  I personally reviewed the relevant tests, images, and reports as documented above.  I formulated and edited as necessary the assessment and plan and discussed the findings and management plan with the patient and family. I personally reviewed the ophthalmic test(s) associated with this encounter, agree with the interpretation(s) as documented by the resident/fellow, and have edited the corresponding report(s) as necessary.  - Harsh Verdugo MD  Resident Physician - PGY2  Department of Ophthalmology   [facility]

## 2021-08-30 NOTE — LETTER
2021         RE:  :  MRN: Jude Zazueta  1960  [id number]     Dear Dr. Salazar,    Thank you for asking me to see your very pleasant patient, Jude Zazueta, in neuro-ophthalmic consultation.  I would like to thank you for sending your records and I have summarized them in the history of present illness. My assessment and plan are below.  For further details, please see my attached clinic note.      Assessment & Plan     HPI:  Two years ago patient first developed symptoms of sore throat and was found to have thyrotoxicosis with diffuse goiter and without thyrotoxic crisis or storm. Was subsequently diagnosed with Graves Disease. Treatment with methimazole was started and has gradually been weaned to 5mg TID which is what he takes now. At the time of diagnosis had proptosis of left eye which [facility] felt would decrease with time but it has not improved. Also endorses dryness in both eyes for which he uses AT 2-3 times per day and AT ointment at bedtime, with minimal improvement. Patient's biggest concern is decreasing asymmetric proptosis in left eye compared to right.    [facility]erring [facility]    Thyroid history:  Diagnosed when? 2018  LAL: None  Thyroidectomy: None    TSI (date): none          Previous results:  2020: CT scan of orbits: bilateral symmetric 4mm proptosis with normal extraocular musculature size. Suspect increase in intraorbital fat.   2021: TSH 5.46     2021: Labs performed at [facility] (not available)     2019:       T4, Free 0.7 - 1.7 ng/dL 1.1           Thyroid Stimulating Hormone 0.40 - 3.99 uIU/mL <0.01Low         Component 02/20/19 10/30/18 06/21/18   EXTERNAL T4, FREE 1.18 6.56Abnormal   2.46Abnormal      Component 02/20/19 10/30/18 06/21/18   EXTERNAL TSH 0.01Abnormal  <0.01Abnormal  <0.01Abnormal        Eye symptoms (since when):   Proptosis (better/worse/same since last visit):   Present  (stable)  Diplopia(better/worse/same since last visit):    None (stable)  Eyelid retraction(better/worse/same since last visit):  None (stable)  Tearing(better/worse/same since last visit):    Daily (stable)  Redness (better/worse/same since last visit):   Constant (stable)  Pain ((better/worse/same since last visit):    Left eye (stable)  Pain to move the eyes (better/worse/same since last visit): Left eye (stable)  Blurred vision:       When driving right eye (stable)    Ocular history:   Orbital decompression (date, details): none  Strabismus surgery (date, details): none  Eyelid surgery (date, details): none    Exam:   Joan : 108/25/27    Strabismus (better/worse/same): abduction deficit, none in primary gaze  Eyelid retraction (better/worse/same): none    1. Spontaneous orbital pain.     0  2. Gaze evoked orbital pain.     1  3. Eyelid swelling due to active thyroid eye disease  0  4. Eyelid erythema.      0  5. Conjunctival redness due to active thyroid eye disease . 1  6. Chemosis.        0  7. Inflammation of caruncle OR plica.   0    SHERIE SCORE = 2    Jude Zazueta is a 61 year old male with the following diagnoses:   1. Thyrotoxicosis with diffuse goiter without thyrotoxic crisis or storm      Thyroid eye disease (RICHIE).  The natural history of thyroid eye disease was discussed. We told the patient that typically RICHIE will worsen for a period of time, then improve to some degree, and then stabilize without normalizing.  This process can take somewhere between 1 and 3 years on average.  In the meantime, we recommended seeing the patient in the [facility] every 6 months (sooner if the patient experiences worsening vision in either eye).    Finally, we discussed that correcting the thyroid hormone levels does not ensure that the eyes will normalize but that it could help to some degree.       We discussed orbital decompression with the patient since his SHERIE score is so low.  We  scheduled the patient for follow up in 4 months to make sure he remains quiescent.  Discuss with Dr. Marko Ledezma and Marques Angelo MD.     Again, thank you for allowing me to participate in the care of your patient.      Sincerely,    Harsh Carrillo MD  Professor  Ophthalmology Residency   Director of Neuro-Ophthalmology  [employer] [facility] [employer] Endowed Chair  Departments of Ophthalmology, Neurology, and Neurosurgery  [address]  [address]  T - [phone]  F - [phone]  [email or web address]      CC: Finn Salazar MD  [facility]   [facility] [address]  Via Fax: [phone]     DALE Melendez  [address] [address]  Via In Basket     Rosmery Daly MD  [address] [facility] [address]  Via In Basket    DX = Thyroid eye disease

## 2021-08-30 NOTE — Clinical Note
8/30/2021       RE: Jude Zazueta  [address]  [place] [address]     Dear Colleague,    Thank you for referring your patient, Jude Zazueta, to the [facility]. Please see a copy of my visit note below.           Assessment & Plan     HPI:  Two years ago patient first developed symptoms of sore throat and was found to have thyrotoxicosis with diffuse goiter and without thyrotoxic crisis or storm. Was subsequently diagnosed with Graves Disease. Treatment with methimazole was started and has gradually been weaned to 5mg TID which is what he takes now. At the time of diagnosis had proptosis of left eye which [facility], felt would decrease with time but it has not improved. Also endorses dryness in both eyes for which he uses AT 2-3 times per day and AT ointment at bedtime, with minimal improvement. Patient's biggest concern is decreasing asymmetric proptosis in left eye compared to right.    [facility]    Thyroid history:  Diagnosed when? 2018  LAL: None  Thyroidectomy: None    TSI (date): none          Previous results:  02/19/2020: CT scan of orbits: bilateral symmetric 4mm proptosis with normal extraocular musculature size. Suspect increase in intraorbital fat.   06/03/2021: TSH 5.46     08/25/2021: Labs performed at [facility] (not available)     03/2019:       T4, Free 0.7 - 1.7 ng/dL 1.1           Thyroid Stimulating Hormone 0.40 - 3.99 uIU/mL <0.01Low         Component 02/20/19 10/30/18 06/21/18   EXTERNAL T4, FREE 1.18 6.56Abnormal   2.46Abnormal      Component 02/20/19 10/30/18 06/21/18   EXTERNAL TSH 0.01Abnormal  <0.01Abnormal  <0.01Abnormal        Eye symptoms (since when):   Proptosis (better/worse/same since last visit):   Present (stable)  Diplopia(better/worse/same since last visit):    None (stable)  Eyelid retraction(better/worse/same since last visit):  None  (stable)  Tearing(better/worse/same since last visit):    Daily (stable)  Redness (better/worse/same since last visit):   Constant (stable)  Pain ((better/worse/same since last visit):    Left eye (stable)  Pain to move the eyes (better/worse/same since last visit): Left eye (stable)  Blurred vision:       When driving right eye (stable)    Ocular history:   Orbital decompression (date, details): none  Strabismus surgery (date, details): none  Eyelid surgery (date, details): none    Exam:   Joan : 108/25/27    Strabismus (better/worse/same): abduction deficit, none in primary gaze  Eyelid retraction (better/worse/same): none    1. Spontaneous orbital pain.     0  2. Gaze evoked orbital pain.     1  3. Eyelid swelling due to active thyroid eye disease  0  4. Eyelid erythema.      0  5. Conjunctival redness due to active thyroid eye disease . 1  6. Chemosis.        0  7. Inflammation of caruncle OR plica.   0    SHERIE SCORE = 2    Jude Zazueta is a 61 year old male with the following diagnoses:   1. Thyrotoxicosis with diffuse goiter without thyrotoxic crisis or storm      Thyroid eye disease (RICHIE).  The natural history of thyroid eye disease was discussed. We told the patient that typically RICHIE will worsen for a period of time, then improve to some degree, and then stabilize without normalizing.  This process can take somewhere between 1 and 3 years on average.  In the meantime, we recommended seeing the patient in the [facility] every 6 months (sooner if the patient experiences worsening vision in either eye).    Finally, we discussed that correcting the thyroid hormone levels does not ensure that the eyes will normalize but that it could help to some degree.       We discussed orbital decompression with the patient since his SHERIE score is so low.  We scheduled the patient for follow up in 4 months to make sure he remains quiescent.  Discuss with Dr. Marko Ledezma and Marques Angelo MD.              Attending Physician Attestation:  Complete documentation of historical and exam elements from today's encounter can be found in the full encounter summary report (not reduplicated in this progress note).  I personally obtained the chief complaint(s) and history of present illness.  I confirmed and edited as necessary the review of systems, past medical/surgical history, family history, social history, and examination findings as documented by others; and I examined the patient myself.  I personally reviewed the relevant tests, images, and reports as documented above.  I formulated and edited as necessary the assessment and plan and discussed the findings and management plan with the patient and family. I personally reviewed the ophthalmic test(s) associated with this encounter, agree with the interpretation(s) as documented by the resident/fellow, and have edited the corresponding report(s) as necessary.  - Harsh Verdugo MD  Resident Physician - PGY2  Department of Ophthalmology   [facility]        Again, thank you for allowing me to participate in the care of your patient.      Sincerely,    Harsh Carrillo MD

## 2021-08-30 NOTE — NURSING NOTE
Chief Complaint(s) and History of Present Illness(es)     Thyroid Disease     Laterality: both eyes    Associated symptoms: redness, dryness and photophobia    Treatments tried: artificial tears and ointment    Comments: Dx graves disease about 2 years ago, taking methimazole 5mg 3 tabs daily, notes LE proptosis for over a year, +dry and redness, [facility] seems [facility], no diplopia, difficulty closing LE at night, using ointment at night, AT prn daily              Comments     Had CT scan of orbits on 02/19/2020 06/03/2021  TSH 5.46   Had labs done 08/25/2021 at [facility]     Inf patient

## 2021-10-02 NOTE — PROGRESS NOTES
Assessment & Plan     Benign essential hypertension  BP remains on the high side. Continues to have edema despite high dose of lasix. Will have pt stop norvasc as may be contributing and start aldactone as below. May decrease lasix to 1-2 tabs daily depending on swelling level rather than 2 daily. Discussed timing as well, pt has been taking in the evening which is likely contributing to urinating all night. Follow up 1 mo for bp and bmp recheck. Pt to call with any significant worsening of BP control, lightheadedness, dizziness, etc.    - spironolactone (ALDACTONE) 50 MG tablet  Dispense: 90 tablet; Refill: 0  - spironolactone (ALDACTONE) 50 MG tablet  Dispense: 7 tablet; Refill: 0    Graves disease  Notes reviewed. [facility] now involved. Some increase in left lower eye swelling, however, suspect this is fluid related rather than graves.     Gastroesophageal reflux disease with esophagitis, unspecified whether hemorrhage  Has decreased his PPI use. Symptoms occasionally flare which is managed by TUMS, monitor.       30 minutes spent on the date of the encounter doing chart review, review of outside records, interpretation of tests, patient visit and documentation     Return in about 1 month (around 11/8/2021) for BP Recheck, BMP labs. .    Rosmery Daly MD  [facility] - DENILSON Roque is a 61 year old who presents for the following health issues     HPI     Hypertension Follow-up      Do you check your blood pressure regularly outside of the clinic? Occasionally    Are you following a low salt diet? Yes    Are your blood pressures ever more than 140 on the top number (systolic) OR more   than 90 on the bottom number (diastolic), for example 140/90? Yes     Ongoing LE swelling, but this is mild. Noting facial swelling/puffiness under eyes, jahaira left eye. Does not resolve with sleeping, resting.     BP remains intermittently elevated. Feeling  generally well.     Tired to urinating so often, all day and night. Taking lasix 40mg am and at bedtime.     Hyperthyroidism Follow-up      Since last visit, patient describes the following symptoms: Weight stable, no hair loss, no skin changes, no constipation, no loose stools      How many servings of fruits and vegetables do you eat daily?  2-3    On average, how many sweetened beverages do you drink each day (Examples: soda, juice, sweet tea, etc.  Do NOT count diet or artificially sweetened beverages)?   0    How many days per week do you exercise enough to make your heart beat faster? 7    How many minutes a day do you exercise enough to make your heart beat faster? 10 - 19    How many days per week do you miss taking your medication? 0    GERD/Heartburn  Onset/Duration: Ongoing  Intensity: mild  Progression of Symptoms: improving  Medications: Omeprazole (Prilosec), provides relief    Taking prn now, tums needed about once every other week.     Review of Systems   Constitutional, HEENT, cardiovascular, pulmonary, gi and gu systems are negative, except as otherwise noted.      Objective    BP (!) 138/90 (BP Location: Left arm, Patient Position: Left side, Cuff Size: Adult Regular)   Pulse 87   Temp 99.2  F (37.3  C) (Tympanic)   Resp 16   Wt 120.2 kg (265 lb)   SpO2 95%   BMI 34.96 kg/m    Body mass index is 34.96 kg/m .gerd    Physical Exam   GENERAL: alert and no distress  NECK: no adenopathy, no asymmetry, masses, or scars and thyroid normal to palpation  HEENT: there is pocket of fluid under left eye, no redness, warmth.   RESP: lungs clear to auscultation - no rales, rhonchi or wheezes  CV: regular rates and rhythm, normal S1 S2, no S3 or S4, no murmur, click or rub and 1+ bilateral LE edema.   ABDOMEN: soft, nontender, no hepatosplenomegaly, no masses and bowel sounds normal  SKIN: no suspicious lesions or rashes  NEURO: Normal strength and tone, mentation intact and speech normal  PSYCH: mentation  appears normal, affect normal/bright and judgement and insight intact    No results found for any visits on 10/08/21.

## 2021-10-04 LAB
CREATININE (EXTERNAL): 1.2 MG/DL (ref 0.7–1.2)
GFR ESTIMATED (EXTERNAL): >60 ML/MIN/1.73M2
GLUCOSE (EXTERNAL): 104 MG/DL (ref 74–106)
POTASSIUM (EXTERNAL): 3.1 MMOL/L (ref 3.5–5)

## 2021-10-08 ENCOUNTER — OFFICE VISIT (OUTPATIENT)
Dept: FAMILY MEDICINE | Facility: OTHER | Age: 61
End: 2021-10-08
Attending: FAMILY MEDICINE
Payer: COMMERCIAL

## 2021-10-08 VITALS
OXYGEN SATURATION: 95 % | SYSTOLIC BLOOD PRESSURE: 138 MMHG | BODY MASS INDEX: 34.96 KG/M2 | DIASTOLIC BLOOD PRESSURE: 90 MMHG | HEART RATE: 87 BPM | RESPIRATION RATE: 16 BRPM | TEMPERATURE: 99.2 F | WEIGHT: 265 LBS

## 2021-10-08 DIAGNOSIS — I10 BENIGN ESSENTIAL HYPERTENSION: Primary | ICD-10-CM

## 2021-10-08 DIAGNOSIS — E05.00 GRAVES DISEASE: ICD-10-CM

## 2021-10-08 DIAGNOSIS — K21.00 GASTROESOPHAGEAL REFLUX DISEASE WITH ESOPHAGITIS, UNSPECIFIED WHETHER HEMORRHAGE: ICD-10-CM

## 2021-10-08 PROCEDURE — 99214 OFFICE O/P EST MOD 30 MIN: CPT | Performed by: FAMILY MEDICINE

## 2021-10-08 RX ORDER — SPIRONOLACTONE 50 MG/1
50 TABLET, FILM COATED ORAL DAILY
Qty: 90 TABLET | Refills: 0 | Status: SHIPPED | OUTPATIENT
Start: 2021-10-08 | End: 2021-10-12

## 2021-10-08 RX ORDER — POTASSIUM CHLORIDE 750 MG/1
10 TABLET, EXTENDED RELEASE ORAL 2 TIMES DAILY
COMMUNITY
End: 2021-10-08

## 2021-10-08 RX ORDER — SPIRONOLACTONE 50 MG/1
50 TABLET, FILM COATED ORAL DAILY
Qty: 7 TABLET | Refills: 0 | Status: SHIPPED | OUTPATIENT
Start: 2021-10-08 | End: 2021-11-09

## 2021-10-08 RX ORDER — POTASSIUM CHLORIDE 750 MG/1
TABLET, EXTENDED RELEASE ORAL
COMMUNITY
Start: 2021-09-01 | End: 2021-10-08

## 2021-10-08 ASSESSMENT — PAIN SCALES - GENERAL: PAINLEVEL: NO PAIN (0)

## 2021-10-08 NOTE — NURSING NOTE
"Chief Complaint   Patient presents with     Hypertension     Thyroid Problem     Gastrophageal Reflux       Initial BP (!) 138/90 (BP Location: Left arm, Patient Position: Left side, Cuff Size: Adult Regular)   Pulse 87   Temp 99.2  F (37.3  C) (Tympanic)   Resp 16   Wt 120.2 kg (265 lb)   SpO2 95%   BMI 34.96 kg/m   Estimated body mass index is 34.96 kg/m  as calculated from the following:    Height as of 7/27/21: 1.854 m (6' 1\").    Weight as of this encounter: 120.2 kg (265 lb).  Medication Reconciliation: complete  MARIO Peters CNP    "

## 2021-10-12 DIAGNOSIS — I10 BENIGN ESSENTIAL HYPERTENSION: ICD-10-CM

## 2021-10-12 RX ORDER — SPIRONOLACTONE 50 MG/1
50 TABLET, FILM COATED ORAL DAILY
Qty: 30 TABLET | Refills: 0 | Status: SHIPPED | OUTPATIENT
Start: 2021-10-12 | End: 2021-11-09

## 2021-10-12 NOTE — TELEPHONE ENCOUNTER
Call from patient requesting a 30 day supply to go to [facility] [place] as [facility] is [facility] for the [facility] as [facility] was not in the system.     spironolactone (ALDACTONE) 50 MG tablet 7 tablet 0 10/8/2021  --   Sig - Route: Take 1 tablet (50 mg) by mouth daily - Oral

## 2021-10-14 ENCOUNTER — TRANSFERRED RECORDS (OUTPATIENT)
Dept: HEALTH INFORMATION MANAGEMENT | Facility: HOSPITAL | Age: 61
End: 2021-10-14

## 2021-10-14 RX ORDER — SPIRONOLACTONE 50 MG/1
TABLET, FILM COATED ORAL
Qty: 7 TABLET | Refills: 0 | OUTPATIENT
Start: 2021-10-14

## 2021-10-24 ENCOUNTER — HEALTH MAINTENANCE LETTER (OUTPATIENT)
Age: 61
End: 2021-10-24

## 2021-11-09 ENCOUNTER — OFFICE VISIT (OUTPATIENT)
Dept: FAMILY MEDICINE | Facility: OTHER | Age: 61
End: 2021-11-09
Attending: FAMILY MEDICINE
Payer: COMMERCIAL

## 2021-11-09 VITALS
WEIGHT: 265 LBS | HEART RATE: 92 BPM | HEIGHT: 73 IN | TEMPERATURE: 98 F | BODY MASS INDEX: 35.12 KG/M2 | OXYGEN SATURATION: 97 % | SYSTOLIC BLOOD PRESSURE: 136 MMHG | DIASTOLIC BLOOD PRESSURE: 88 MMHG

## 2021-11-09 DIAGNOSIS — I10 BENIGN ESSENTIAL HYPERTENSION: ICD-10-CM

## 2021-11-09 DIAGNOSIS — I10 PRIMARY HYPERTENSION: Primary | ICD-10-CM

## 2021-11-09 DIAGNOSIS — R60.0 BILATERAL LEG EDEMA: ICD-10-CM

## 2021-11-09 DIAGNOSIS — E05.00 GRAVES DISEASE: ICD-10-CM

## 2021-11-09 DIAGNOSIS — K21.00 GASTROESOPHAGEAL REFLUX DISEASE WITH ESOPHAGITIS, UNSPECIFIED WHETHER HEMORRHAGE: ICD-10-CM

## 2021-11-09 LAB
ANION GAP SERPL CALCULATED.3IONS-SCNC: 2 MMOL/L (ref 3–14)
BUN SERPL-MCNC: 17 MG/DL (ref 7–30)
CALCIUM SERPL-MCNC: 8.7 MG/DL (ref 8.5–10.1)
CHLORIDE BLD-SCNC: 111 MMOL/L (ref 94–109)
CO2 SERPL-SCNC: 27 MMOL/L (ref 20–32)
CREAT SERPL-MCNC: 1.24 MG/DL (ref 0.66–1.25)
GFR SERPL CREATININE-BSD FRML MDRD: 62 ML/MIN/1.73M2
GLUCOSE BLD-MCNC: 93 MG/DL (ref 70–99)
POTASSIUM BLD-SCNC: 3.6 MMOL/L (ref 3.4–5.3)
SODIUM SERPL-SCNC: 140 MMOL/L (ref 133–144)

## 2021-11-09 PROCEDURE — 99213 OFFICE O/P EST LOW 20 MIN: CPT | Performed by: FAMILY MEDICINE

## 2021-11-09 PROCEDURE — 36415 COLL VENOUS BLD VENIPUNCTURE: CPT | Performed by: FAMILY MEDICINE

## 2021-11-09 PROCEDURE — 80048 BASIC METABOLIC PNL TOTAL CA: CPT | Performed by: FAMILY MEDICINE

## 2021-11-09 RX ORDER — SPIRONOLACTONE 50 MG/1
50 TABLET, FILM COATED ORAL DAILY
Qty: 30 TABLET | Refills: 0 | Status: SHIPPED | OUTPATIENT
Start: 2021-11-09 | End: 2021-11-12

## 2021-11-09 RX ORDER — SPIRONOLACTONE 50 MG/1
50 TABLET, FILM COATED ORAL DAILY
Qty: 90 TABLET | Refills: 1 | Status: SHIPPED | OUTPATIENT
Start: 2021-11-09

## 2021-11-09 ASSESSMENT — ANXIETY QUESTIONNAIRES
7. FEELING AFRAID AS IF SOMETHING AWFUL MIGHT HAPPEN: NOT AT ALL
5. BEING SO RESTLESS THAT IT IS HARD TO SIT STILL: NOT AT ALL
1. FEELING NERVOUS, ANXIOUS, OR ON EDGE: NOT AT ALL
6. BECOMING EASILY ANNOYED OR IRRITABLE: NOT AT ALL
GAD7 TOTAL SCORE: 0
2. NOT BEING ABLE TO STOP OR CONTROL WORRYING: NOT AT ALL
4. TROUBLE RELAXING: NOT AT ALL
3. WORRYING TOO MUCH ABOUT DIFFERENT THINGS: NOT AT ALL

## 2021-11-09 ASSESSMENT — PAIN SCALES - GENERAL: PAINLEVEL: NO PAIN (0)

## 2021-11-09 ASSESSMENT — MIFFLIN-ST. JEOR: SCORE: 2055.91

## 2021-11-09 NOTE — LETTER
November 11, 2021      Jude Zazueta  [address] [place] [address]        Dear ,    We are writing to inform you of your test results.    {results letter list:[id number]}    Resulted Orders   Basic metabolic panel   Result Value Ref Range    Sodium 140 133 - 144 mmol/L    Potassium 3.6 3.4 - 5.3 mmol/L    Chloride 111 (H) 94 - 109 mmol/L    Carbon Dioxide (CO2) 27 20 - 32 mmol/L    Anion Gap 2 (L) 3 - 14 mmol/L    Urea Nitrogen 17 7 - 30 mg/dL    Creatinine 1.24 0.66 - 1.25 mg/dL    Calcium 8.7 8.5 - 10.1 mg/dL    Glucose 93 70 - 99 mg/dL    GFR Estimate 62 >60 mL/min/1.73m2      Comment:      As of July 11, 2021, eGFR is calculated by the CKD-EPI creatinine equation, without race adjustment. eGFR can be influenced by muscle mass, exercise, and diet. The reported eGFR is an estimation only and is only applicable if the renal function is stable.       If you have any questions or concerns, please call the clinic at the number listed above.       Sincerely,      Rosmery Daly MD

## 2021-11-09 NOTE — PROGRESS NOTES
Assessment & Plan       Benign essential hypertension  Controlled. Continue on spironolactone, metoprolol and losartan. Recheck BMP today.   - Basic metabolic panel    Gastroesophageal reflux disease with esophagitis, unspecified whether hemorrhage  Controlled. Uses omeprazole 20mg as needed.     Graves disease  [facility]. Continues on tap[facility]. No new concerns today.     Bilateral leg edema  Significantly improved since stopping Norvasc. Continues to take lasix 40 mg, 1-2 tablets daily. Discussed tapering dose, start with 1 tablet daily and if tolerating may go to every other day. Encouraged to call with worsening edema.     [facility]       Return in about 3 months (around 2/9/2022).     Darline Brar, MARIO, CNP    I, Rosmery Daly MD, saw this patient and agree with the findings and plan of care as documented in the note. I was present during examination of the patient. Any procedures were completed by myself. Documentation has been personally reviewed and edited.     Rosmery Daly MD  [facility] [place] Roque is a 61 year old who presents for the following health issues     HPI     Hypertension Follow-up      Do you check your blood pressure regularly outside of the clinic? Yes     Are you following a low salt diet? Yes    Are your blood pressures ever more than 140 on the top number (systolic) OR more   than 90 on the bottom number (diastolic), for example 140/90? Yes    Hypothyroidism Follow-up      Since last visit, patient describes the following symptoms: Weight stable, no hair loss, no skin changes, no constipation, no loose stools      Review of Systems   Constitutional, HEENT, cardiovascular, pulmonary, gi and gu systems are negative, except as otherwise noted.      Objective    /88 (BP Location: Left arm, Patient Position: Sitting, Cuff Size: Adult Regular)   Pulse 92   Temp 98  F (36.7  C) (Tympanic)   Ht  "1.854 m (6' 1\")   Wt 120.2 kg (265 lb)   SpO2 97%   BMI 34.96 kg/m    Body mass index is 34.96 kg/m .       Physical Exam   GENERAL: healthy, alert and no distress  NECK: no adenopathy, no asymmetry, masses, or scars and thyroid normal to palpation  RESP: lungs clear to auscultation - no rales, rhonchi or wheezes  CV: regular rate and rhythm, normal S1 S2, no S3 or S4, no murmur, click or rub, no peripheral edema and peripheral pulses strong  MS: no gross musculoskeletal defects noted, no edema            "

## 2021-11-10 ASSESSMENT — ANXIETY QUESTIONNAIRES: GAD7 TOTAL SCORE: 0

## 2021-11-10 ASSESSMENT — PATIENT HEALTH QUESTIONNAIRE - PHQ9: SUM OF ALL RESPONSES TO PHQ QUESTIONS 1-9: 0

## 2021-11-12 ENCOUNTER — TELEPHONE (OUTPATIENT)
Dept: FAMILY MEDICINE | Facility: OTHER | Age: 61
End: 2021-11-12
Payer: COMMERCIAL

## 2021-11-12 DIAGNOSIS — I10 BENIGN ESSENTIAL HYPERTENSION: ICD-10-CM

## 2021-11-12 RX ORDER — SPIRONOLACTONE 50 MG/1
50 TABLET, FILM COATED ORAL DAILY
Qty: 90 TABLET | Refills: 0 | Status: SHIPPED | OUTPATIENT
Start: 2021-11-12 | End: 2022-05-03

## 2021-11-29 ENCOUNTER — HOSPITAL ENCOUNTER (EMERGENCY)
Facility: HOSPITAL | Age: 61
Discharge: HOME | End: 2021-11-29
Attending: NURSE PRACTITIONER | Admitting: NURSE PRACTITIONER
Payer: COMMERCIAL

## 2021-11-29 VITALS
HEART RATE: 73 BPM | DIASTOLIC BLOOD PRESSURE: 104 MMHG | TEMPERATURE: 98.3 F | RESPIRATION RATE: 16 BRPM | OXYGEN SATURATION: 97 % | SYSTOLIC BLOOD PRESSURE: 161 MMHG

## 2021-11-29 DIAGNOSIS — H65.192 OTHER ACUTE NONSUPPURATIVE OTITIS MEDIA OF LEFT EAR, RECURRENCE NOT SPECIFIED: ICD-10-CM

## 2021-11-29 PROCEDURE — 99213 OFFICE O/P EST LOW 20 MIN: CPT | Performed by: NURSE PRACTITIONER

## 2021-11-29 PROCEDURE — G0463 HOSPITAL OUTPT CLINIC VISIT: HCPCS

## 2021-11-29 RX ORDER — AZITHROMYCIN 250 MG/1
TABLET, FILM COATED ORAL
Qty: 6 TABLET | Refills: 0 | Status: SHIPPED | OUTPATIENT
Start: 2021-11-29 | End: 2021-12-04

## 2021-11-29 ASSESSMENT — ENCOUNTER SYMPTOMS
EYES NEGATIVE: 1
SINUS PRESSURE: 0
NAUSEA: 0
MYALGIAS: 1
PSYCHIATRIC NEGATIVE: 1
NECK STIFFNESS: 0
SORE THROAT: 0
HEADACHES: 1
FEVER: 1
DIARRHEA: 0
VOMITING: 0
COUGH: 1
ARTHRALGIAS: 1

## 2021-11-29 NOTE — ED TRIAGE NOTES
Patient presents to urgent care for Left ear pain that radiates down the neck into the jaw that started last night. Patient states he is having a headache from the ear pain. Pain 7/10 for both ear and headache. Patient denies any other symptoms. Patient states he don't get usually have headaches or gets sick.  Patient doesn't not want a COVID test.  BP recheck.

## 2021-11-29 NOTE — ED PROVIDER NOTES
History     Chief Complaint   Patient presents with     Otalgia     left     HPI  Jude Zazueta is a 61 year old male who developed severe left ear pain last night. No fever or drainage.  Pain goes  Down Left neck.  +HA,  Can't hear from ear.  Dry cough, no nausea , vomiting , Diarrhea.  On Eloquis    Allergies:  Allergies   Allergen Reactions     Lisinopril Cough     Nifedipine Swelling     Ankle swelling     Pollen Extract        Problem List:    Patient Active Problem List    Diagnosis Date Noted     Gastroesophageal reflux disease with esophagitis, unspecified whether hemorrhage 10/08/2021     Priority: Medium     Graves disease      Priority: Medium     HTN (hypertension)      Priority: Medium     Muscle weakness 11/30/2017     Priority: Medium     Chronic pain of right knee 11/30/2017     Priority: Medium     Sinusitis, chronic 04/12/2013     Priority: Medium     Problem list name updated by automated process. Provider to review       Nasal obstruction 04/12/2013     Priority: Medium     Chronic rhinitis 04/12/2013     Priority: Medium     Allergic rhinitis on IT       Hypertrophy of nasal turbinates 04/12/2013     Priority: Medium     Deviated nasal septum 04/12/2013     Priority: Medium     Advanced care planning/counseling discussion 07/18/2012     Priority: Medium        Past Medical History:    Past Medical History:   Diagnosis Date     Graves disease      HTN (hypertension)        Past Surgical History:    Past Surgical History:   Procedure Laterality Date     COLONOSCOPY  10-    adenoma polyps x 2     PROSTATE CANCER SURGERY         Family History:    Family History   Problem Relation Age of Onset     Diabetes Sister      Thyroid Disease Brother      Prostate Cancer Brother      Thyroid Disease Mother      Prostate Cancer Father      Strabismus No family hx of        Social History:  Marital Status:   [2]  Social History     Tobacco Use     Smoking status: Former Smoker      Packs/day: 1.00     Years: 30.00     Pack years: 30.00     Types: Cigarettes     Smokeless tobacco: Never Used     Tobacco comment: Quit in 2005; no passive smoke exposure   Substance Use Topics     Alcohol use: Yes     Comment: Occasionally     Drug use: No        Medications:    atorvastatin (LIPITOR) 40 MG tablet  azithromycin (ZITHROMAX Z-ANNEMARIE) 250 MG tablet  ELIQUIS ANTICOAGULANT 5 MG tablet  fluticasone (FLONASE) 50 MCG/ACT nasal spray  furosemide (LASIX) 40 MG tablet  losartan (COZAAR) 100 MG tablet  methimazole (TAPAZOLE) 5 MG tablet  metoprolol tartrate (LOPRESSOR) 50 MG tablet  omeprazole (PRILOSEC) 20 MG DR capsule  spironolactone (ALDACTONE) 50 MG tablet  tamsulosin (FLOMAX) 0.4 MG 24 hr capsule  mometasone (NASONEX) 50 MCG/ACT nasal spray  spironolactone (ALDACTONE) 50 MG tablet          Review of Systems   Constitutional: Positive for fever.   HENT: Positive for ear pain. Negative for congestion, ear discharge, postnasal drip, sinus pressure and sore throat.    Eyes: Negative.    Respiratory: Positive for cough.         Dry cough     Gastrointestinal: Negative for diarrhea, nausea and vomiting.   Musculoskeletal: Positive for arthralgias and myalgias. Negative for neck stiffness.        Pain shoots from ear to left neck.     Skin: Negative for rash.   Neurological: Positive for headaches.   Psychiatric/Behavioral: Negative.        Physical Exam   BP: (!) 193/126  Pulse: 87  Temp: 98.3  F (36.8  C)  Resp: 16  SpO2: 97 %      Physical Exam  Constitutional:       General: He is not in acute distress.     Appearance: Normal appearance.   HENT:      Right Ear: Tympanic membrane, ear canal and external ear normal. There is no impacted cerumen.      Left Ear: Ear canal and external ear normal. There is no impacted cerumen.      Ears:      Comments: Left TM red and retracted     Nose: Congestion present. No rhinorrhea.      Mouth/Throat:      Mouth: Mucous membranes are moist.      Pharynx: Oropharynx is clear.  No posterior oropharyngeal erythema.   Eyes:      Extraocular Movements: Extraocular movements intact.      Conjunctiva/sclera: Conjunctivae normal.      Pupils: Pupils are equal, round, and reactive to light.   Neck:      Comments: Has large firm bulges to either side of thyroid. States has been there for some time.    Cardiovascular:      Rate and Rhythm: Normal rate and regular rhythm.      Heart sounds: Normal heart sounds. No murmur heard.      Pulmonary:      Effort: Pulmonary effort is normal.      Breath sounds: Normal breath sounds.   Musculoskeletal:         General: Normal range of motion.      Cervical back: Normal range of motion and neck supple.   Lymphadenopathy:      Cervical: Cervical adenopathy present.   Skin:     General: Skin is warm and dry.   Neurological:      General: No focal deficit present.      Mental Status: He is alert and oriented to person, place, and time.   Psychiatric:         Mood and Affect: Mood normal.         Behavior: Behavior normal.         ED Course        Procedures                  No results found for this or any previous visit (from the past 24 hour(s)).    Medications - No data to display    Assessments & Plan (with Medical Decision Making)     I have reviewed the nursing notes.    I have reviewed the findings, diagnosis, plan and need for follow up with the patient. Discussed having lumps next to thyroid check-may be his normal, but just in case.        New Prescriptions    AZITHROMYCIN (ZITHROMAX Z-ANNEMARIE) 250 MG TABLET    Two tablets on the first day, then one tablet daily for the next 4 days       Final diagnoses:   Other acute nonsuppurative otitis media of left ear, recurrence not specified     ASSESSMENT / PLAN:  (H65.192) Other acute nonsuppurative otitis media of left ear, recurrence not specified  Comment:   Plan:   1. Zpak  500mg today, 250mg a day for 4 days(OK with your meds.)  2. 2. Drink plenty fluids  3. 3. Tylenol for discomfort.      11/29/2021   [facility]     Ced Garcia NP  11/29/21 0959

## 2021-11-29 NOTE — DISCHARGE INSTRUCTIONS
Zpak  500mg today, 250mg a day for 4 days(OK with your meds.)  2. Drink plenty fluids  3. Tylenol for discomfort.

## 2021-11-29 NOTE — ED TRIAGE NOTES
Patient presents with left ear pain that started last night, radiates to left side of face, jaw and neck.   Did take tylenol yesterday evening around 1800.   Reports taking his blood pressure meds this am but did not take his lasix and states he is in a lot of pain.    Complains of decreased hearing on the left side.

## 2021-12-02 LAB — TSH SERPL-ACNC: 2.87 UIU/ML (ref 0.35–4.94)

## 2021-12-03 ENCOUNTER — HOSPITAL ENCOUNTER (EMERGENCY)
Facility: HOSPITAL | Age: 61
End: 2021-12-03
Payer: COMMERCIAL

## 2022-01-31 ENCOUNTER — OFFICE VISIT (OUTPATIENT)
Dept: OPHTHALMOLOGY | Facility: CLINIC | Age: 62
End: 2022-01-31
Attending: OPHTHALMOLOGY
Payer: COMMERCIAL

## 2022-01-31 DIAGNOSIS — H50.51 ESOPHORIA OF BOTH EYES: Primary | ICD-10-CM

## 2022-01-31 DIAGNOSIS — H57.89 THYROID EYE DISEASE: ICD-10-CM

## 2022-01-31 DIAGNOSIS — I10 BENIGN ESSENTIAL HYPERTENSION: ICD-10-CM

## 2022-01-31 DIAGNOSIS — E07.9 THYROID EYE DISEASE: ICD-10-CM

## 2022-01-31 PROCEDURE — G0463 HOSPITAL OUTPT CLINIC VISIT: HCPCS | Mod: 25

## 2022-01-31 PROCEDURE — 92060 SENSORIMOTOR EXAMINATION: CPT | Performed by: OPHTHALMOLOGY

## 2022-01-31 PROCEDURE — 99214 OFFICE O/P EST MOD 30 MIN: CPT | Mod: GC | Performed by: OPHTHALMOLOGY

## 2022-01-31 RX ORDER — SPIRONOLACTONE 50 MG/1
50 TABLET, FILM COATED ORAL DAILY
Qty: 90 TABLET | Refills: 1 | Status: CANCELLED | OUTPATIENT
Start: 2022-01-31

## 2022-01-31 ASSESSMENT — VISUAL ACUITY
OS_SC: 20/20
OD_PH_SC: 20/25
METHOD: SNELLEN - LINEAR
OD_SC: 20/40
OD_PH_SC+: +1
OS_SC+: -2
OD_SC+: +2

## 2022-01-31 ASSESSMENT — CUP TO DISC RATIO
OS_RATIO: 0.3
OD_RATIO: 0.3

## 2022-01-31 ASSESSMENT — REFRACTION_WEARINGRX
SPECS_TYPE: READERS
OS_ADD: +3.00
OD_ADD: +3.00

## 2022-01-31 ASSESSMENT — SLIT LAMP EXAM - LIDS
COMMENTS: NORMAL
COMMENTS: NORMAL

## 2022-01-31 ASSESSMENT — CONF VISUAL FIELD
OS_NORMAL: 1
METHOD: TOYS
OD_NORMAL: 1

## 2022-01-31 ASSESSMENT — TONOMETRY
OD_IOP_MMHG: 20
OS_IOP_MMHG: 17

## 2022-01-31 NOTE — NURSING NOTE
Chief Complaint(s) and History of Present Illness(es)     Thyroid Disease     Associated symptoms: Negative for glare, haloes and double vision    Treatments tried: ointment and artificial tears              Comments     Vision seems stable. No diplopia. Dryness sometimes, using AT as needed. Using ointment at night as needed.  Proptosis LE same as before, seems worse when tired. No redness noted. Pt noticing more crustiness of both eyes in the mornings.   Currently taking methimazole 5mg every day.   Last Labs done in October 2021   Inf: patient

## 2022-01-31 NOTE — PROGRESS NOTES
Assessment & Plan     HPI:  Two years ago patient first developed symptoms of sore throat and was found to have thyrotoxicosis with diffuse goiter and without thyrotoxic crisis or storm. Was subsequently diagnosed with Graves Disease. Treatment with methimazole was started and has gradually been weaned to 5mg TID which is what he takes now. At the time of diagnosis had proptosis of left eye which [facility], felt would decrease with time but it has not improved. Also endorses dryness in both eyes for which he uses AT 2-3 times per day and AT ointment at bedtime, with minimal improvement. Patient's biggest concern is decreasing asymmetric proptosis in left eye compared to right.    Interval hx 1/31/2022  Patient reports that bulging in left eye is the same, eye is irritated when he is tired. Left eyelid difficult to hold shut when sleeping. Using artificial tear ointment and drops which doesn't resolve his symptoms. Inquiring about tepezza versus surgery for left eye bulging correction.    Taking methimazole 15mg QAM. [facility] after [facility] visit, with endocrinologist Dr. Salazar, was told they were stable. Unable to access records from [facility].    Has new PCP at [place], Dr. Daly who is managing hypertension, which is improved.    No current use of tobacco, quit 10+ years ago.    Referring physician: [facility]. Salazar from [facility]    Thyroid history:  Diagnosed when? 2018  LAL: None  Thyroidectomy: None    TSI (date): none          Previous results:  02/19/2020: CT scan of orbits: bilateral symmetric 4mm proptosis with normal extraocular musculature size. Suspect increase in intraorbital fat.   06/03/2021: TSH 5.46     08/25/2021: Labs performed at [facility] (not available)     03/2019:       T4, Free 0.7 - 1.7 ng/dL 1.1           Thyroid Stimulating Hormone 0.40 - 3.99 uIU/mL <0.01Low         Component 02/20/19 10/30/18 06/21/18   EXTERNAL T4, FREE 1.18 6.56Abnormal   2.46Abnormal       Component 02/20/19 10/30/18 06/21/18   EXTERNAL TSH 0.01Abnormal  <0.01Abnormal  <0.01Abnormal        Eye symptoms (since when):   Proptosis (better/worse/same since last visit):   Present (stable)  Diplopia(better/worse/same since last visit):    None (stable)  Eyelid retraction(better/worse/same since last visit):  None (stable)  Tearing(better/worse/same since last visit):    Sometimes in the cold (improved)  Redness (better/worse/same since last visit):   Resolved (better)  Pain ((better/worse/same since last visit):    Left eye irritation (stable)  Pain to move the eyes (better/worse/same since last visit): None (improved)  Blurred vision:       When driving right eye (stable)    Ocular history:   Orbital decompression (date, details): none  Strabismus surgery (date, details): none  Eyelid surgery (date, details): none    Exam:   Joan : 108/25/27 stable today 1/31/22    Strabismus (better/worse/same): abduction deficit improved, ortho in primary gaze  Eyelid retraction (better/worse/same): none    1. Spontaneous orbital pain.     0  2. Gaze evoked orbital pain.     0  3. Eyelid swelling due to active thyroid eye disease  0  4. Eyelid erythema.      0  5. Conjunctival redness due to active thyroid eye disease . 0  6. Chemosis.        1 mild  7. Inflammation of caruncle OR plica.   0    SHERIE SCORE = 1    Jude Zazueta is a 61 year old male with the following diagnoses:   1. Esophoria of both eyes    2. Benign essential hypertension        It is my impression that the patient has stable non active thyroid eye disease. His proptosis is stable in both eyes, left greater than right. Patient is interested in addressing left eye proptosis.     We discussed orbital decompression with the patient since his SHERIE score is so low and his proptosis is stable.      PLAN:   Left lateral decompression with loretta mora and Sonopet  Dr. Ledezma discussed the risks, benefits, and alternatives to decompression surgery.  Risks include need for further surgery, infection, bleeding, loss of vision, double vision, changes in pupil size, droopy eyelid, spinal fluid leak, and scarring. Additionally there are separate and rarely potentially serious risks associated with general anesthesia              Attending Physician Attestation:  Complete documentation of historical and exam elements from today's encounter can be found in the full encounter summary report (not reduplicated in this progress note).  I personally obtained the chief complaint(s) and history of present illness.  I confirmed and edited as necessary the review of systems, past medical/surgical history, family history, social history, and examination findings as documented by others; and I examined the patient myself.  I personally reviewed the relevant tests, images, and reports as documented above.  I formulated and edited as necessary the assessment and plan and discussed the findings and management plan with the patient and family. I personally reviewed the ophthalmic test(s) associated with this encounter, agree with the interpretation(s) as documented by the resident/fellow, and have edited the corresponding report(s) as necessary.  - Harsh Sy MD  Ophthalmology Resident, PGY-3  [facility]

## 2022-02-02 ENCOUNTER — TELEPHONE (OUTPATIENT)
Dept: OPHTHALMOLOGY | Facility: CLINIC | Age: 62
End: 2022-02-02
Payer: COMMERCIAL

## 2022-02-02 NOTE — TELEPHONE ENCOUNTER
Met with patient to schedule surgery with Dr. Ledezma    Surgery was scheduled on 5/11 at [facility]  Patient will have H&P at [facility]     Patient is aware a COVID-19 test is needed before their procedure. The test should be with-in 4 days of their procedure.   Test Details: Date 5/9  [facility]    Post-Op visit was scheduled on 5/23  Patient is aware a / is needed day of surgery.   Surgery packet was given 2/2, patient has my direct contact information for any further questions.

## 2022-02-09 ENCOUNTER — OFFICE VISIT (OUTPATIENT)
Dept: FAMILY MEDICINE | Facility: OTHER | Age: 62
End: 2022-02-09
Attending: FAMILY MEDICINE
Payer: COMMERCIAL

## 2022-02-09 VITALS
BODY MASS INDEX: 32.07 KG/M2 | OXYGEN SATURATION: 98 % | SYSTOLIC BLOOD PRESSURE: 132 MMHG | DIASTOLIC BLOOD PRESSURE: 88 MMHG | WEIGHT: 242 LBS | RESPIRATION RATE: 18 BRPM | HEART RATE: 64 BPM | TEMPERATURE: 97.6 F | HEIGHT: 73 IN

## 2022-02-09 DIAGNOSIS — K21.00 GASTROESOPHAGEAL REFLUX DISEASE WITH ESOPHAGITIS, UNSPECIFIED WHETHER HEMORRHAGE: ICD-10-CM

## 2022-02-09 DIAGNOSIS — I48.91 ATRIAL FIBRILLATION, UNSPECIFIED TYPE (H): ICD-10-CM

## 2022-02-09 DIAGNOSIS — I10 PRIMARY HYPERTENSION: Primary | ICD-10-CM

## 2022-02-09 DIAGNOSIS — E05.00 GRAVES DISEASE: ICD-10-CM

## 2022-02-09 PROCEDURE — 99213 OFFICE O/P EST LOW 20 MIN: CPT | Performed by: FAMILY MEDICINE

## 2022-02-09 ASSESSMENT — PAIN SCALES - GENERAL: PAINLEVEL: MILD PAIN (3)

## 2022-02-09 ASSESSMENT — MIFFLIN-ST. JEOR: SCORE: 1951.58

## 2022-02-09 NOTE — PROGRESS NOTES
Assessment & Plan     Primary hypertension  Control reasonable at this point. Continue current medications. Repeat labs at follow up    Graves disease  Stable, with eye disease. Plans for repair, will get pre op. [facility]     Gastroesophageal reflux disease with esophagitis, unspecified whether hemorrhage  Controlled    Atrial fibrillation, unspecified type (H)  Rate controlled. Asymptomatic. Continue Eliquis.       25 minutes spent on the date of the encounter doing chart review, review of test results, interpretation of tests, patient visit and documentation     No follow-ups on file.    Rosmery Daly MD  [facility] - DENILSON Rouqe is a 61 year old who presents for the following health issues:     HPI     Hypertension Follow-up      Do you check your blood pressure regularly outside of the clinic? Yes     Are you following a low salt diet? Yes    Are your blood pressures ever more than 140 on the top number (systolic) OR more   than 90 on the bottom number (diastolic), for example 140/90? Yes    Hypothyroidism Follow-up      Since last visit, patient describes the following symptoms: Weight stable, no hair loss, no skin changes, no constipation, no loose stools      How many servings of fruits and vegetables do you eat daily?  0-1    On average, how many sweetened beverages do you drink each day (Examples: soda, juice, sweet tea, etc.  Do NOT count diet or artificially sweetened beverages)?   0    How many days per week do you exercise enough to make your heart beat faster? 4    How many minutes a day do you exercise enough to make your heart beat faster? 10 - 19    How many days per week do you miss taking your medication? 0    GERD/Heartburn  Onset/Duration: Chronic  Description: Acid reflux  Intensity: mild  Progression of Symptoms: improving and pretty much resolved  Accompanying Signs & Symptoms:  Does it feel like food gets stuck or trouble  "swallowing: no  Nausea: no  Vomiting (bloody?): no  Abdominal Pain: no  Black-Tarry stools: no  Bloody stools: no  History:  Previous similar episodes: YES  Previous ulcers: no  Precipitating factors:   Caffeine use: YES  Alcohol use: On occassions   NSAID/Aspirin use: no  Tobacco use: no  Worse with no particular food or drink.  Alleviating factors: None  Therapies tried and outcome:             Lifestyle changes: Prilosec             Medications: Omeprazole (Prilosec)    Review of Systems   Constitutional, HEENT, cardiovascular, pulmonary, gi and gu systems are negative, except as otherwise noted.      Objective    /88 (BP Location: Left arm, Patient Position: Sitting, Cuff Size: Adult Large)   Pulse 64   Temp 97.6  F (36.4  C) (Tympanic)   Resp 18   Ht 1.854 m (6' 1\")   Wt 109.8 kg (242 lb)   SpO2 98%   BMI 31.93 kg/m    Body mass index is 31.93 kg/m .  Physical Exam   GENERAL: alert and no distress  NECK: no adenopathy, no asymmetry, masses, or scars and thyroid normal to palpation  RESP: lungs clear to auscultation - no rales, rhonchi or wheezes  CV: regular rates and rhythm, normal S1 S2, no S3 or S4, no murmur, click or rub and no peripheral edema  MS: no gross musculoskeletal defects noted, no edema  NEURO: Normal strength and tone, mentation intact and speech normal  PSYCH: mentation appears normal, affect normal/bright    No results found for any visits on 02/09/22.      "

## 2022-02-09 NOTE — NURSING NOTE
"Chief Complaint   Patient presents with     Hypertension     Thyroid Problem     Gastrointestinal Problem       Initial /88 (BP Location: Left arm, Patient Position: Sitting, Cuff Size: Adult Large)   Pulse 64   Temp 97.6  F (36.4  C) (Tympanic)   Resp 18   Ht 1.854 m (6' 1\")   Wt 109.8 kg (242 lb)   SpO2 98%   BMI 31.93 kg/m   Estimated body mass index is 31.93 kg/m  as calculated from the following:    Height as of this encounter: 1.854 m (6' 1\").    Weight as of this encounter: 109.8 kg (242 lb).  Medication Reconciliation: complete  Marisol Jung LPN  "

## 2022-03-02 LAB — TSH SERPL-ACNC: 4.92 UIU/ML (ref 0.35–4.94)

## 2022-03-26 DIAGNOSIS — Z11.59 ENCOUNTER FOR SCREENING FOR OTHER VIRAL DISEASES: Primary | ICD-10-CM

## 2022-04-05 ENCOUNTER — TELEPHONE (OUTPATIENT)
Dept: OPHTHALMOLOGY | Facility: CLINIC | Age: 62
End: 2022-04-05
Payer: COMMERCIAL

## 2022-04-05 NOTE — TELEPHONE ENCOUNTER
Patient called and requested to have his covid orders faxed to [phone].    Patients covid orders have been faxed at the patients request.    Silvana Dawn  Perioperative   Ophthalmology/Oculoplastics  [phone]

## 2022-04-20 ENCOUNTER — TELEPHONE (OUTPATIENT)
Dept: FAMILY MEDICINE | Facility: OTHER | Age: 62
End: 2022-04-20
Payer: COMMERCIAL

## 2022-04-20 DIAGNOSIS — H66.90 CHRONIC EAR INFECTION, UNSPECIFIED LATERALITY: Primary | ICD-10-CM

## 2022-04-20 NOTE — TELEPHONE ENCOUNTER
Patient requesting ENT referral as was suggested by ER doc at [facility] for chronic ear infection/ pain, pended if wish.  Marisol Jung LPN

## 2022-04-20 NOTE — TELEPHONE ENCOUNTER
2:46 PM    Reason for Call: Phone Call    Description:  Pt is needing a referral to see ENT that Urgent Care suggests he sees    Was an appointment offered for this call? No  If yes : Appointment type              Date    Preferred method for responding to this message: Telephone Call  What is your phone number ? [phone]    If we cannot reach you directly, may we leave a detailed response at the number you provided? Yes    Can this message wait until your PCP/provider returns, if available today? YES    Kelsy Sutton

## 2022-04-21 ENCOUNTER — TRANSCRIBE ORDERS (OUTPATIENT)
Dept: OTHER | Age: 62
End: 2022-04-21
Payer: COMMERCIAL

## 2022-04-21 DIAGNOSIS — E05.00 THYROTOXICOSIS WITH DIFFUSE GOITER WITHOUT THYROTOXIC CRISIS OR STORM: Primary | ICD-10-CM

## 2022-04-21 DIAGNOSIS — H91.93 DECREASED HEARING OF BOTH EARS: Primary | ICD-10-CM

## 2022-04-25 ENCOUNTER — OFFICE VISIT (OUTPATIENT)
Dept: AUDIOLOGY | Facility: OTHER | Age: 62
End: 2022-04-25
Attending: AUDIOLOGIST
Payer: COMMERCIAL

## 2022-04-25 ENCOUNTER — OFFICE VISIT (OUTPATIENT)
Dept: OTOLARYNGOLOGY | Facility: OTHER | Age: 62
End: 2022-04-25
Attending: NURSE PRACTITIONER
Payer: COMMERCIAL

## 2022-04-25 VITALS
WEIGHT: 239 LBS | HEIGHT: 73 IN | HEART RATE: 67 BPM | OXYGEN SATURATION: 97 % | SYSTOLIC BLOOD PRESSURE: 138 MMHG | TEMPERATURE: 97.1 F | DIASTOLIC BLOOD PRESSURE: 84 MMHG | BODY MASS INDEX: 31.68 KG/M2

## 2022-04-25 DIAGNOSIS — H93.13 TINNITUS, BILATERAL: ICD-10-CM

## 2022-04-25 DIAGNOSIS — Z86.69 HISTORY OF OTITIS MEDIA: Primary | ICD-10-CM

## 2022-04-25 DIAGNOSIS — H91.93 DECREASED HEARING OF BOTH EARS: ICD-10-CM

## 2022-04-25 DIAGNOSIS — E04.9 ENLARGED THYROID GLAND: ICD-10-CM

## 2022-04-25 DIAGNOSIS — H90.3 ASYMMETRICAL SENSORINEURAL HEARING LOSS: ICD-10-CM

## 2022-04-25 DIAGNOSIS — H90.6 MIXED CONDUCTIVE AND SENSORINEURAL HEARING LOSS OF BOTH EARS: Primary | ICD-10-CM

## 2022-04-25 PROCEDURE — 99213 OFFICE O/P EST LOW 20 MIN: CPT | Mod: 25 | Performed by: NURSE PRACTITIONER

## 2022-04-25 PROCEDURE — 92550 TYMPANOMETRY & REFLEX THRESH: CPT | Performed by: AUDIOLOGIST

## 2022-04-25 PROCEDURE — 92504 EAR MICROSCOPY EXAMINATION: CPT | Performed by: NURSE PRACTITIONER

## 2022-04-25 PROCEDURE — 92557 COMPREHENSIVE HEARING TEST: CPT | Performed by: AUDIOLOGIST

## 2022-04-25 ASSESSMENT — PAIN SCALES - GENERAL: PAINLEVEL: MODERATE PAIN (5)

## 2022-04-25 NOTE — PATIENT INSTRUCTIONS
Thank you for allowing Eleanor Oquendo and our ENT team to participate in your care.  If your medications are too expensive, please give the nurse a call.  We can possibly change this medication.  If you have a scheduling or an appointment question please contact our Health Unit Coordinator at their direct line [phone] ext [phone].   ALL nursing questions or concerns can be directed to your ENT nurse at: [phone] - [place]     Ordered MRI of the internal auditory canal, someone will call you to schedule.  Nurse will call you with the results 7-10 days after the MRI    Finish the Zyrtec  Continue Claritin    Follow up in 2 weeks if you don't improve    Follow up as needed in the future

## 2022-04-25 NOTE — LETTER
4/25/2022         RE: Jude Zazueta  [address] [place] [address]        Dear Colleague,    Thank you for referring your patient, Jude Zazueta, to the [facility]. Please see a copy of my visit note below.      Otolaryngology Note         Chief Complaint:     Patient presents with:  Follow Up: Chronic Ear Infection            History of Present Illness:     Jude Zazueta is a 62 year old male seen today plugged sensation and recent OM in left ear.  He reports he had URI, left OM with pain about 2 weeks ago.  He was seen in the  and treated with abx.  He has noted improvement in plugged sensation and crackling in his ears since treatment.      He has been taking zyrtec and claritin with improvement.     He has had associated headache with URI symptoms.    No vertigo/tinnitus.  No concerns for flux hearing.  Otalgia has since resolved.  He has had no otorrhea.  Aural fullness that was worse on left than right that has made interval improvement with near resolution.  No facial numbness or tingling.    Able to breath through nose, no facial pain or pressure.      He has a history of allergies with immunotherapy x 2 rounds.  He completed his second round of immunotherapy in 2017.    He has a history of [facility] History of FNA in the past per patient report.        Audiogram completed today:   Tympanograms are Type A for right ear suggesting normal eardrum mobility and Type C left ear -negative pressure..  Acoustic Reflex Thresholds at 1000 Hz are present only left contra and absent ipsi right-could not complete left due to leaking even with  large probe tip.  Thresholds are using inserts and TDH- slight to normal left ear sloping to moderate-severe primarily sensorineural hearing loss and right  ear moderate rising to normal sloping to mild mixed loss.  Speech reception thresholds are in good agreement with pure tone average.  Word  discrimination scores right 88% at 55 dB, left 92% at 65 dB    Previous imaging:   No previous MRI IAC or brain         Medications:     Current Outpatient Rx   Medication Sig Dispense Refill     atorvastatin (LIPITOR) 40 MG tablet 40 mg daily        ELIQUIS ANTICOAGULANT 5 MG tablet 5 mg 2 times daily        fluticasone (FLONASE) 50 MCG/ACT nasal spray        furosemide (LASIX) 40 MG tablet Take 40 mg by mouth daily        losartan (COZAAR) 100 MG tablet Take 100 mg by mouth daily       methimazole (TAPAZOLE) 5 MG tablet Take 3 tablets by mouth daily       metoprolol tartrate (LOPRESSOR) 50 MG tablet Take 1.5 tablets by mouth twice daily       mometasone (NASONEX) 50 MCG/ACT nasal spray Spray 2 sprays into both nostrils daily       omeprazole (PRILOSEC) 20 MG DR capsule Take 20 mg by mouth daily Takes as needed       spironolactone (ALDACTONE) 50 MG tablet Take 1 tablet (50 mg) by mouth daily 90 tablet 0     spironolactone (ALDACTONE) 50 MG tablet Take 1 tablet (50 mg) by mouth daily 90 tablet 1     tamsulosin (FLOMAX) 0.4 MG 24 hr capsule Take 0.4 mg by mouth daily Takes at bedtime              Allergies:     Allergies: Lisinopril, Nifedipine, and Pollen extract          Past Medical History:     Past Medical History:   Diagnosis Date     Graves disease      HTN (hypertension)             Past Surgical History:     Past Surgical History:   Procedure Laterality Date     COLONOSCOPY  10-    adenoma polyps x 2     PROSTATE CANCER SURGERY              Social History:     Social History     Tobacco Use     Smoking status: Former Smoker     Packs/day: 1.00     Years: 30.00     Pack years: 30.00     Types: Cigarettes     Smokeless tobacco: Never Used     Tobacco comment: Quit in 2005; no passive smoke exposure   Substance Use Topics     Alcohol use: Yes     Comment: Occasionally     Drug use: No            Review of Systems:     ROS: See HPI         Physical Exam:     /84 (BP Location: Right arm, Cuff Size:  "Adult Large)   Pulse 67   Temp 97.1  F (36.2  C) (Tympanic)   Ht 1.854 m (6' 1\")   Wt 108.4 kg (239 lb)   SpO2 97%   BMI 31.53 kg/m      General - The patient is well nourished and well developed, and appears to have good nutritional status.  Alert and oriented to person and place, answers questions and cooperates with examination appropriately.   Head and Face - Normocephalic and atraumatic, with no gross asymmetry noted.  The facial nerve is intact, with strong symmetric movements.  Voice and Breathing - The patient was breathing comfortably without the use of accessory muscles. There was no wheezing, stridor. The patients voice was clear and strong, and had appropriate pitch and quality.  Ears - The ears were examined under binocular microscopy and with otoscope.  Bilateral TM's are intact, good movement of TM's with valsalva.  The left TM has very minimal serous effusion inferiorly, no jama retraction noted.  No effusion or retraction noted on right.   Eyes - Extraocular movements intact, sclera were not icteric or injected, conjunctiva were pink and moist.  Mouth - Examination of the oral cavity showed pink, healthy oral mucosa. Dentition in good condition. No lesions or ulcerations noted. The tongue was mobile and midline.   Throat - The walls of the oropharynx were smooth, pink, moist, symmetric, and had no lesions or ulcerations.  The tonsillar pillars and soft palate were symmetric. The uvula was midline on elevation.    Neck - Normal midline excursion of the laryngotracheal complex during swallowing.  Full range of motion on passive movement.  Palpation of the occipital, submental, submandibular, internal jugular chain, and supraclavicular nodes did not demonstrate any abnormal lymph nodes or masses.  Palpation of the thyroid was soft and smooth, with no nodules or goiter appreciated.  The trachea was mobile and midline.  Nose - External contour is symmetric, no gross deflection or scars, there is a " 2-3 mm vascular mass on the left tip of the nose.  It pales with palpation.  He reports no changes in 30+ years of monitoring.   Nasal mucosa is pink and moist with no abnormal mucus.  The septum and turbinates were evaluated with nasal speculum, no polyps, masses, or purulence noted on examination.         Assessment and Plan:       ICD-10-CM    1. History of otitis media left  Z86.69     resolved with minimal effusion   2. Asymmetrical sensorineural hearing loss  H90.3 MR Internal Auditory Canal wo&w Contrast     Complete MRI IAC   Finish Zyrtec  Continue Claritin  Follow up in 2 weeks if symptoms are not completely resolved.    Eleanor GUARDADO  [facility]        Again, thank you for allowing me to participate in the care of your patient.        Sincerely,        Eleanor Oquendo NP

## 2022-04-25 NOTE — PROGRESS NOTES
Otolaryngology Note         Chief Complaint:     Patient presents with:  Follow Up: Chronic Ear Infection            History of Present Illness:     Jude Zazueta is a 62 year old male seen today plugged sensation and recent OM in left ear.  He reports he had URI, left OM with pain about 2 weeks ago.  He was seen in the  and treated with abx.  He has noted improvement in plugged sensation and crackling in his ears since treatment.      He has been taking zyrtec and claritin with improvement.     He has had associated headache with URI symptoms.    No vertigo/tinnitus.  No concerns for flux hearing.  Otalgia has since resolved.  He has had no otorrhea.  Aural fullness that was worse on left than right that has made interval improvement with near resolution.  No facial numbness or tingling.    Able to breath through nose, no facial pain or pressure.      He has a history of allergies with immunotherapy x 2 rounds.  He completed his second round of immunotherapy in 2017.    He has a history of [facility] disease[facility] History of FNA in the past per patient report.  I was able to look [facility] and there is documentation stating that he has a history of multinodular thyroid with FNA completed on right, left mid, and left inferior nodules, all benign.  He is on methimazole for TSH suppression, this is also [facility]    Audiogram completed today:   Tympanograms are Type A for right ear suggesting normal eardrum mobility and Type C left ear -negative pressure..  Acoustic Reflex Thresholds at 1000 Hz are present only left contra and absent ipsi right-could not complete left due to leaking even with  large probe tip.  Thresholds are using inserts and TDH- slight to normal left ear sloping to moderate-severe primarily sensorineural hearing loss and right  ear moderate rising to normal sloping to mild mixed loss.  Speech reception thresholds are in good agreement with pure tone  average.  Word discrimination scores right 88% at 55 dB, left 92% at 65 dB    Previous imaging:   No previous MRI IAC or brain         Medications:     Current Outpatient Rx   Medication Sig Dispense Refill     atorvastatin (LIPITOR) 40 MG tablet 40 mg daily        ELIQUIS ANTICOAGULANT 5 MG tablet 5 mg 2 times daily        fluticasone (FLONASE) 50 MCG/ACT nasal spray        furosemide (LASIX) 40 MG tablet Take 40 mg by mouth daily        losartan (COZAAR) 100 MG tablet Take 100 mg by mouth daily       methimazole (TAPAZOLE) 5 MG tablet Take 3 tablets by mouth daily       metoprolol tartrate (LOPRESSOR) 50 MG tablet Take 1.5 tablets by mouth twice daily       mometasone (NASONEX) 50 MCG/ACT nasal spray Spray 2 sprays into both nostrils daily       omeprazole (PRILOSEC) 20 MG DR capsule Take 20 mg by mouth daily Takes as needed       spironolactone (ALDACTONE) 50 MG tablet Take 1 tablet (50 mg) by mouth daily 90 tablet 0     spironolactone (ALDACTONE) 50 MG tablet Take 1 tablet (50 mg) by mouth daily 90 tablet 1     tamsulosin (FLOMAX) 0.4 MG 24 hr capsule Take 0.4 mg by mouth daily Takes at bedtime              Allergies:     Allergies: Lisinopril, Nifedipine, and Pollen extract          Past Medical History:     Past Medical History:   Diagnosis Date     Graves disease      HTN (hypertension)             Past Surgical History:     Past Surgical History:   Procedure Laterality Date     COLONOSCOPY  10-    adenoma polyps x 2     PROSTATE CANCER SURGERY              Social History:     Social History     Tobacco Use     Smoking status: Former Smoker     Packs/day: 1.00     Years: 30.00     Pack years: 30.00     Types: Cigarettes     Smokeless tobacco: Never Used     Tobacco comment: Quit in 2005; no passive smoke exposure   Substance Use Topics     Alcohol use: Yes     Comment: Occasionally     Drug use: No            Review of Systems:     ROS: See HPI         Physical Exam:     /84 (BP Location: Right  "arm, Cuff Size: Adult Large)   Pulse 67   Temp 97.1  F (36.2  C) (Tympanic)   Ht 1.854 m (6' 1\")   Wt 108.4 kg (239 lb)   SpO2 97%   BMI 31.53 kg/m      General - The patient is well nourished and well developed, and appears to have good nutritional status.  Alert and oriented to person and place, answers questions and cooperates with examination appropriately.   Head and Face - Normocephalic and atraumatic, with no gross asymmetry noted.  The facial nerve is intact, with strong symmetric movements.  Voice and Breathing - The patient was breathing comfortably without the use of accessory muscles. There was no wheezing, stridor. The patients voice was clear and strong, and had appropriate pitch and quality.  Ears - The ears were examined under binocular microscopy and with otoscope.  Bilateral TM's are intact, good movement of TM's with valsalva.  The left TM has very minimal serous effusion inferiorly, no jama retraction noted.  No effusion or retraction noted on right.   Eyes - Extraocular movements intact, sclera were not icteric or injected, conjunctiva were pink and moist.  Mouth - Examination of the oral cavity showed pink, healthy oral mucosa. Dentition in good condition. No lesions or ulcerations noted. The tongue was mobile and midline.   Throat - The walls of the oropharynx were smooth, pink, moist, symmetric, and had no lesions or ulcerations.  The tonsillar pillars and soft palate were symmetric. The uvula was midline on elevation.    Neck - Normal midline excursion of the laryngotracheal complex during swallowing.  Full range of motion on passive movement.  Palpation of the occipital, submental, submandibular, internal jugular chain, and supraclavicular nodes did not demonstrate any abnormal lymph nodes or masses.  The thyroid is grossly enlarged, multinodular.  The trachea was mobile and midline.  Nose - External contour is symmetric, no gross deflection or scars, there is a 2-3 mm vascular mass " on the left tip of the nose.  It pales with palpation.  He reports no changes in 30+ years of monitoring.   Nasal mucosa is pink and moist with no abnormal mucus.  The septum and turbinates were evaluated with nasal speculum, no polyps, masses, or purulence noted on examination.         Assessment and Plan:       ICD-10-CM    1. History of otitis media left  Z86.69     resolved with minimal effusion   2. Asymmetrical sensorineural hearing loss  H90.3 MR Internal Auditory Canal wo&w Contrast   3. Enlarged thyroid gland  E04.9      Complete MRI IAC   Finish Zyrtec  Continue Claritin  Follow up in 2 weeks if symptoms are not completely resolved.  [facility].  I do not have records    Eleanor GUARDADO  [facility]

## 2022-04-25 NOTE — PROGRESS NOTES
Audiology Evaluation Completed. Please refer SCANNED AUDIOGRAM and/or TYMPANOGRAM for BACKGROUND, RESULTS, RECOMMENDATIONS.      Cindi DAY, [facility]  Audiologist #[id number]

## 2022-04-25 NOTE — NURSING NOTE
"Chief Complaint   Patient presents with     Follow Up     Chronic Ear Infection        Initial /84 (BP Location: Right arm, Cuff Size: Adult Large)   Pulse 67   Temp 97.1  F (36.2  C) (Tympanic)   Ht 1.854 m (6' 1\")   Wt 108.4 kg (239 lb)   SpO2 97%   BMI 31.53 kg/m   Estimated body mass index is 31.53 kg/m  as calculated from the following:    Height as of this encounter: 1.854 m (6' 1\").    Weight as of this encounter: 108.4 kg (239 lb).  Medication Reconciliation: complete  Verito Newman LPN    "

## 2022-05-02 ENCOUNTER — HOSPITAL ENCOUNTER (OUTPATIENT)
Dept: MRI IMAGING | Facility: HOSPITAL | Age: 62
Discharge: HOME | End: 2022-05-02
Attending: NURSE PRACTITIONER | Admitting: NURSE PRACTITIONER
Payer: COMMERCIAL

## 2022-05-02 DIAGNOSIS — H90.3 ASYMMETRICAL SENSORINEURAL HEARING LOSS: ICD-10-CM

## 2022-05-02 PROCEDURE — A9585 GADOBUTROL INJECTION: HCPCS | Performed by: RADIOLOGY

## 2022-05-02 PROCEDURE — 70543 MRI ORBT/FAC/NCK W/O &W/DYE: CPT

## 2022-05-02 PROCEDURE — 255N000002 HC RX 255 OP 636: Performed by: RADIOLOGY

## 2022-05-02 RX ORDER — GADOBUTROL 604.72 MG/ML
10 INJECTION INTRAVENOUS ONCE
Status: COMPLETED | OUTPATIENT
Start: 2022-05-02 | End: 2022-05-02

## 2022-05-02 RX ADMIN — GADOBUTROL 10 ML: 604.72 INJECTION INTRAVENOUS at 12:03

## 2022-05-03 ENCOUNTER — OFFICE VISIT (OUTPATIENT)
Dept: FAMILY MEDICINE | Facility: OTHER | Age: 62
End: 2022-05-03
Attending: FAMILY MEDICINE
Payer: COMMERCIAL

## 2022-05-03 ENCOUNTER — TELEPHONE (OUTPATIENT)
Dept: FAMILY MEDICINE | Facility: OTHER | Age: 62
End: 2022-05-03
Payer: COMMERCIAL

## 2022-05-03 VITALS
RESPIRATION RATE: 16 BRPM | DIASTOLIC BLOOD PRESSURE: 88 MMHG | HEART RATE: 67 BPM | TEMPERATURE: 98.2 F | WEIGHT: 240 LBS | SYSTOLIC BLOOD PRESSURE: 138 MMHG | HEIGHT: 73 IN | OXYGEN SATURATION: 98 % | BODY MASS INDEX: 31.81 KG/M2

## 2022-05-03 DIAGNOSIS — I10 PRIMARY HYPERTENSION: ICD-10-CM

## 2022-05-03 DIAGNOSIS — I48.91 ATRIAL FIBRILLATION, UNSPECIFIED TYPE (H): ICD-10-CM

## 2022-05-03 DIAGNOSIS — E05.00 GRAVES DISEASE: ICD-10-CM

## 2022-05-03 DIAGNOSIS — J34.3 HYPERTROPHY OF NASAL TURBINATES: ICD-10-CM

## 2022-05-03 DIAGNOSIS — Z01.818 PREOP GENERAL PHYSICAL EXAM: Primary | ICD-10-CM

## 2022-05-03 DIAGNOSIS — C61 MALIGNANT NEOPLASM OF PROSTATE (H): ICD-10-CM

## 2022-05-03 DIAGNOSIS — K21.00 GASTROESOPHAGEAL REFLUX DISEASE WITH ESOPHAGITIS, UNSPECIFIED WHETHER HEMORRHAGE: ICD-10-CM

## 2022-05-03 DIAGNOSIS — J34.2 DEVIATED NASAL SEPTUM: ICD-10-CM

## 2022-05-03 LAB
ANION GAP SERPL CALCULATED.3IONS-SCNC: 4 MMOL/L (ref 3–14)
BASOPHILS # BLD AUTO: 0 10E3/UL (ref 0–0.2)
BASOPHILS NFR BLD AUTO: 1 %
BUN SERPL-MCNC: 23 MG/DL (ref 7–30)
CALCIUM SERPL-MCNC: 8.7 MG/DL (ref 8.5–10.1)
CHLORIDE BLD-SCNC: 109 MMOL/L (ref 94–109)
CO2 SERPL-SCNC: 26 MMOL/L (ref 20–32)
CREAT SERPL-MCNC: 1.2 MG/DL (ref 0.66–1.25)
EOSINOPHIL # BLD AUTO: 0.2 10E3/UL (ref 0–0.7)
EOSINOPHIL NFR BLD AUTO: 4 %
ERYTHROCYTE [DISTWIDTH] IN BLOOD BY AUTOMATED COUNT: 12.1 % (ref 10–15)
GFR SERPL CREATININE-BSD FRML MDRD: 68 ML/MIN/1.73M2
GLUCOSE BLD-MCNC: 95 MG/DL (ref 70–99)
HCT VFR BLD AUTO: 46.6 % (ref 40–53)
HGB BLD-MCNC: 16 G/DL (ref 13.3–17.7)
IMM GRANULOCYTES # BLD: 0 10E3/UL
IMM GRANULOCYTES NFR BLD: 0 %
LYMPHOCYTES # BLD AUTO: 1.4 10E3/UL (ref 0.8–5.3)
LYMPHOCYTES NFR BLD AUTO: 24 %
MCH RBC QN AUTO: 30.5 PG (ref 26.5–33)
MCHC RBC AUTO-ENTMCNC: 34.3 G/DL (ref 31.5–36.5)
MCV RBC AUTO: 89 FL (ref 78–100)
MONOCYTES # BLD AUTO: 0.6 10E3/UL (ref 0–1.3)
MONOCYTES NFR BLD AUTO: 10 %
NEUTROPHILS # BLD AUTO: 3.5 10E3/UL (ref 1.6–8.3)
NEUTROPHILS NFR BLD AUTO: 61 %
NRBC # BLD AUTO: 0 10E3/UL
NRBC BLD AUTO-RTO: 0 /100
PLATELET # BLD AUTO: 154 10E3/UL (ref 150–450)
POTASSIUM BLD-SCNC: 3.9 MMOL/L (ref 3.4–5.3)
RBC # BLD AUTO: 5.25 10E6/UL (ref 4.4–5.9)
SODIUM SERPL-SCNC: 139 MMOL/L (ref 133–144)
T4 FREE SERPL-MCNC: 0.66 NG/DL (ref 0.76–1.46)
TSH SERPL DL<=0.005 MIU/L-ACNC: 4.54 MU/L (ref 0.4–4)
WBC # BLD AUTO: 5.8 10E3/UL (ref 4–11)

## 2022-05-03 PROCEDURE — 85025 COMPLETE CBC W/AUTO DIFF WBC: CPT | Performed by: FAMILY MEDICINE

## 2022-05-03 PROCEDURE — 93000 ELECTROCARDIOGRAM COMPLETE: CPT | Performed by: INTERNAL MEDICINE

## 2022-05-03 PROCEDURE — 80048 BASIC METABOLIC PNL TOTAL CA: CPT | Performed by: FAMILY MEDICINE

## 2022-05-03 PROCEDURE — 99214 OFFICE O/P EST MOD 30 MIN: CPT | Performed by: FAMILY MEDICINE

## 2022-05-03 PROCEDURE — 84439 ASSAY OF FREE THYROXINE: CPT | Performed by: FAMILY MEDICINE

## 2022-05-03 PROCEDURE — 84443 ASSAY THYROID STIM HORMONE: CPT | Performed by: FAMILY MEDICINE

## 2022-05-03 PROCEDURE — 36415 COLL VENOUS BLD VENIPUNCTURE: CPT | Performed by: FAMILY MEDICINE

## 2022-05-03 ASSESSMENT — PAIN SCALES - GENERAL: PAINLEVEL: MODERATE PAIN (4)

## 2022-05-03 NOTE — PATIENT INSTRUCTIONS
Preparing for Your Surgery  Getting started  A nurse will call you to review your health history and instructions. They will give you an arrival time based on your scheduled surgery time. Please be ready to share:  Your doctor's clinic name and phone number  Your medical, surgical and anesthesia history  A list of allergies and sensitivities  A list of medicines, including herbal treatments and over-the-counter drugs  Whether the patient has a legal guardian (ask how to send us the papers in advance)  Please tell us if you're pregnant--or if there's any chance you might be pregnant. Some surgeries may injure a fetus (unborn baby), so they require a pregnancy test. Surgeries that are safe for a fetus don't always need a test, and you can choose whether to have one.   If you have a child who's having surgery, please ask for a copy of Preparing for Your Child's Surgery.    Preparing for surgery  Within 30 days of surgery: Have a pre-op exam (sometimes called an H&P, or History and Physical). This can be done at a clinic or pre-operative center.  If you're having a , you may not need this exam. Talk to your care team.  At your pre-op exam, talk to your care team about all medicines you take. If you need to stop any medicines before surgery, ask when to start taking them again.  We do this for your safety. Many medicines can make you bleed too much during surgery. Some change how well surgery (anesthesia) drugs work.  HOLD eliquis 2 days before the surgery.   HOLD the lasix on the day of surgery.   Call your insurance company to let them know you're having surgery. (If you don't have insurance, call [phone].)  Call your clinic if there's any change in your health. This includes signs of a cold or flu (sore throat, runny nose, cough, rash, fever). It also includes a scrape or scratch near the surgery site.  If you have questions on the day of surgery, call your hospital or surgery center.  COVID  testing  You may need to be tested for COVID-19 before having surgery. If so, we will give you instructions.  Eating and drinking guidelines  For your safety: Unless your surgeon tells you otherwise, follow the guidelines below.  Eat and drink as usual until 8 hours before surgery. After that, no food or milk.  Drink clear liquids until 2 hours before surgery. These are liquids you can see through, like water, Gatorade and Propel Water. You may also have black coffee and tea (no cream or milk).  Nothing by mouth within 2 hours of surgery. This includes gum, candy and breath mints.  If you drink alcohol: Stop drinking it the night before surgery.  If your care team tells you to take medicine on the morning of surgery, it's okay to take it with a sip of water.  Preventing infection  Shower or bathe the night before and morning of your surgery. Follow the instructions your clinic gave you. (If no instructions, use regular soap.)  Don't shave or clip hair near your surgery site. We'll remove the hair if needed.  Don't smoke or vape the morning of surgery. You may chew nicotine gum up to 2 hours before surgery. A nicotine patch is okay.  Note: Some surgeries require you to completely quit smoking and nicotine. Check with your surgeon.  Your care team will make every effort to keep you safe from infection. We will:  Clean our hands often with soap and water (or an alcohol-based hand rub).  Clean the skin at your surgery site with a special soap that kills germs.  Give you a special gown to keep you warm. (Cold raises the risk of infection.)  Wear special hair covers, masks, gowns and gloves during surgery.  Give antibiotic medicine, if prescribed. Not all surgeries need antibiotics.  What to bring on the day of surgery  Photo ID and insurance card  Copy of your health care directive, if you have one  Glasses and hearing aides (bring cases)  You can't wear contacts during surgery  Inhaler and eye drops, if you use them  (tell us about these when you arrive)  CPAP machine or breathing device, if you use them  A few personal items, if spending the night  If you have . . .  A pacemaker, ICD (cardiac defibrillator) or other implant: Bring the ID card.  An implanted stimulator: Bring the remote control.  A legal guardian: Bring a copy of the certified (court-stamped) guardianship papers.  Please remove any jewelry, including body piercings. Leave jewelry and other valuables at home.  If you're going home the day of surgery  You must have a responsible adult drive you home. They should stay with you overnight as well.  If you don't have someone to stay with you, and you aren't safe to go home alone, we may keep you overnight. Insurance often won't pay for this.  After surgery  If it's hard to control your pain or you need more pain medicine, please call your surgeon's office.  Questions?   If you have any questions for your care team, list them here: _________________________________________________________________________________________________________________________________________________________________________ ____________________________________ ____________________________________ ____________________________________  For informational purposes only. Not to replace the advice of your health care provider. Copyright   2003, 2019 [facility]. All rights reserved. Clinically reviewed by Tiara Arizmendi MD. [employer] [id number] - REV 07/21.

## 2022-05-03 NOTE — NURSING NOTE
"Chief Complaint   Patient presents with     Pre-Op Exam       Initial /88 (BP Location: Left arm, Patient Position: Sitting, Cuff Size: Adult Large)   Pulse 67   Temp 98.2  F (36.8  C)   Resp 16   Ht 1.854 m (6' 1\")   Wt 108.9 kg (240 lb)   SpO2 98%   BMI 31.66 kg/m   Estimated body mass index is 31.66 kg/m  as calculated from the following:    Height as of this encounter: 1.854 m (6' 1\").    Weight as of this encounter: 108.9 kg (240 lb).  Medication Reconciliation: complete  Marisol Jung LPN  "

## 2022-05-03 NOTE — PROGRESS NOTES
[facility] [place]  [address]  [place] [address]  Phone: [phone]  Primary Provider: Brett Daly  Pre-op Performing Provider: BRETT DALY    PREOPERATIVE EVALUATION:  Today's date: 5/3/2022    Jude Zazueta is a 62 year old male who presents for a preoperative evaluation.    Surgical Information:  Surgery/Procedure: Left orbital decompression    Surgery Location: [facility] [place]  Surgeon: Dr. Marko Ledezma  Surgery Date: 5/11/22  Time of Surgery: TBD  Where patient plans to recover: At home with family  Fax number for surgical facility: [phone]    Type of Anesthesia Anticipated: to be determined    Assessment & Plan     The proposed surgical procedure is considered INTERMEDIATE risk.    Preop general physical exam / Graves disease  - Basic metabolic panel  - CBC with platelets and differential    MIRIAM  Stable with CPAP    Malignant neoplasm of prostate (H)  S/p operative intervention, no metastases.     Primary hypertension  Controlled today, borderline. Ok to continue aldactone day of surgery due to BP concerns.     Atrial fibrillation, unspecified type (H)  On anticoag, see instructions below.     Gastroesophageal reflux disease with esophagitis, unspecified whether hemorrhage  Stable, controlled    Hypertrophy of nasal turbinates / Deviated nasal septum  Noted for anesthesia.     Risks and Recommendations:  The patient has the following additional risks and recommendations for perioperative complications:   - No identified additional risk factors other than previously addressed    Medication Instructions:   - apixaban (Eliquis), edoxaban (Savaysa), rivaroxaban (Xarelto): Bleeding risk is moderate or high for this procedure AND CrCl  (>=) 50 mL/min. HOLD 2 days before surgery.    - ACE/ARB: May be continued on the day of surgery.    - Diuretics: HOLD on the day of surgery.   - Statins: Continue taking on the day of surgery.     RECOMMENDATION:  APPROVAL GIVEN to proceed with  proposed procedure, without further diagnostic evaluation.    25 minutes spent on the date of the encounter doing chart review, review of test results, interpretation of tests, patient visit and documentation     Subjective     HPI related to upcoming procedure: Graves related eye disease, plan for decompression.     Preop Questions 5/3/2022   1. Have you ever had a heart attack or stroke? No   2. Have you ever had surgery on your heart or blood vessels, such as a stent placement, a coronary artery bypass, or surgery on an artery in your head, neck, heart, or legs? No   3. Do you have chest pain with activity? No   4. Do you have a history of  heart failure? No   5. Do you currently have a cold, bronchitis or symptoms of other infection? No   6. Do you have a cough, shortness of breath, or wheezing? No   7. Do you or anyone in your family have previous history of blood clots? No   8. Do you or does anyone in your family have a serious bleeding problem such as prolonged bleeding following surgeries or cuts? No   9. Have you ever had problems with anemia or been told to take iron pills? No   10. Have you had any abnormal blood loss such as black, tarry or bloody stools? No   11. Have you ever had a blood transfusion? No   12. Are you willing to have a blood transfusion if it is medically needed before, during, or after your surgery? Yes   13. Have you or any of your relatives ever had problems with anesthesia? No   14. Do you have sleep apnea, excessive snoring or daytime drowsiness? YES - Using CPAP regularly.    14a. Do you have a CPAP machine? Yes   15. Do you have any artifical heart valves or other implanted medical devices like a pacemaker, defibrillator, or continuous glucose monitor? No   16. Do you have artificial joints? No   17. Are you allergic to latex? No     Health Care Directive:  Patient does not have a Health Care Directive or Living Will: Discussed advance care planning with patient; information  given to patient to review.    Preoperative Review of :   reviewed - no record of controlled substances prescribed.      Status of Chronic Conditions:  A-FIB - Patient has a longstanding history of chronic A-fib currently on rate control. Current treatment regimen includes Apixaban for stroke prevention and denies significant symptoms of lightheadedness, palpitations or dyspnea.     HYPERTENSION - Patient has longstanding history of HTN , currently denies any symptoms referable to elevated blood pressure. Specifically denies chest pain, palpitations, dyspnea, orthopnea, PND or peripheral edema. Blood pressure readings have been in normal range. Current medication regimen is as listed below. Patient denies any side effects of medication.     HYPOTHYROIDISM - Patient has a longstanding history of chronic Hypothyroidism. Patient has been doing well, noting no tremor, insomnia, hair loss or changes in skin texture. Continues to take medications as directed, without adverse reactions or side effects. Last TSH   Lab Results   Component Value Date    TSH 4.54 (H) 05/03/2022   .      SLEEP PROBLEM - Patient has a longstanding history of snoring.. Patient has tried OTC medications with limited success.       Review of Systems  Constitutional, neuro, ENT, endocrine, pulmonary, cardiac, gastrointestinal, genitourinary, musculoskeletal, integument and psychiatric systems are negative, except as otherwise noted.    Patient Active Problem List    Diagnosis Date Noted     Malignant neoplasm of prostate (H) 05/03/2022     Priority: Medium     Atrial fibrillation, unspecified type (H) 02/09/2022     Priority: Medium     Gastroesophageal reflux disease with esophagitis, unspecified whether hemorrhage 10/08/2021     Priority: Medium     Graves disease      Priority: Medium     HTN (hypertension)      Priority: Medium     Muscle weakness 11/30/2017     Priority: Medium     Chronic pain of right knee 11/30/2017     Priority:  Medium     Sinusitis, chronic 04/12/2013     Priority: Medium     Problem list name updated by automated process. Provider to review       Nasal obstruction 04/12/2013     Priority: Medium     Chronic rhinitis 04/12/2013     Priority: Medium     Allergic rhinitis on IT       Hypertrophy of nasal turbinates 04/12/2013     Priority: Medium     Deviated nasal septum 04/12/2013     Priority: Medium     Advanced care planning/counseling discussion 07/18/2012     Priority: Medium      Past Medical History:   Diagnosis Date     Graves disease      HTN (hypertension)      Past Surgical History:   Procedure Laterality Date     COLONOSCOPY  10-    adenoma polyps x 2     PROSTATE CANCER SURGERY       Current Outpatient Medications   Medication Sig Dispense Refill     atorvastatin (LIPITOR) 40 MG tablet 40 mg daily        ELIQUIS ANTICOAGULANT 5 MG tablet 5 mg 2 times daily        fluticasone (FLONASE) 50 MCG/ACT nasal spray        furosemide (LASIX) 40 MG tablet Take 40 mg by mouth daily        losartan (COZAAR) 100 MG tablet Take 100 mg by mouth daily       methimazole (TAPAZOLE) 5 MG tablet Take 3 tablets by mouth daily       metoprolol tartrate (LOPRESSOR) 50 MG tablet Take 1.5 tablets by mouth twice daily       mometasone (NASONEX) 50 MCG/ACT nasal spray Spray 2 sprays into both nostrils daily       omeprazole (PRILOSEC) 20 MG DR capsule Take 20 mg by mouth daily Takes as needed       spironolactone (ALDACTONE) 50 MG tablet Take 1 tablet (50 mg) by mouth daily 90 tablet 1     tamsulosin (FLOMAX) 0.4 MG 24 hr capsule Take 0.4 mg by mouth daily Takes at bedtime         Allergies   Allergen Reactions     Lisinopril Cough     Nifedipine Swelling     Ankle swelling     Pollen Extract         Social History     Tobacco Use     Smoking status: Former Smoker     Packs/day: 1.00     Years: 30.00     Pack years: 30.00     Types: Cigarettes     Smokeless tobacco: Never Used     Tobacco comment: Quit in 2005; no passive smoke  "exposure   Substance Use Topics     Alcohol use: Yes     Comment: Occasionally     Family History   Problem Relation Age of Onset     Diabetes Sister      Thyroid Disease Brother      Prostate Cancer Brother      Thyroid Disease Mother      Prostate Cancer Father      Strabismus No family hx of      History   Drug Use No         Objective     /88 (BP Location: Left arm, Patient Position: Sitting, Cuff Size: Adult Large)   Pulse 67   Temp 98.2  F (36.8  C)   Resp 16   Ht 1.854 m (6' 1\")   Wt 108.9 kg (240 lb)   SpO2 98%   BMI 31.66 kg/m      Physical Exam    GENERAL APPEARANCE: healthy, alert and no distress     EYES: Eyes grossly normal to inspection     HENT: ear canals and TM's normal and nose and mouth without ulcers or lesions     NECK: no adenopathy, no asymmetry, masses, or scars and thyroid normal to palpation     RESP: lungs clear to auscultation - no rales, rhonchi or wheezes     CV: regular rates and rhythm, normal S1 S2, no S3 or S4 and no murmur, click or rub     ABDOMEN:  soft, nontender, no HSM or masses and bowel sounds normal     MS: extremities normal- no gross deformities noted     SKIN: no suspicious lesions or rashes     NEURO: Normal strength and tone, sensory exam grossly normal, mentation intact and speech normal     PSYCH: mentation appears normal. and affect normal/bright     LYMPHATICS: No cervical adenopathy    Recent Labs   Lab Test 11/09/21  1347      POTASSIUM 3.6   CR 1.24        Diagnostics:    Recent Results (from the past 24 hour(s))   TSH with free T4 reflex    Collection Time: 05/03/22  1:35 PM   Result Value Ref Range    TSH 4.54 (H) 0.40 - 4.00 mU/L   Basic metabolic panel    Collection Time: 05/03/22  1:35 PM   Result Value Ref Range    Sodium 139 133 - 144 mmol/L    Potassium 3.9 3.4 - 5.3 mmol/L    Chloride 109 94 - 109 mmol/L    Carbon Dioxide (CO2) 26 20 - 32 mmol/L    Anion Gap 4 3 - 14 mmol/L    Urea Nitrogen 23 7 - 30 mg/dL    Creatinine 1.20 0.66 - " 1.25 mg/dL    Calcium 8.7 8.5 - 10.1 mg/dL    Glucose 95 70 - 99 mg/dL    GFR Estimate 68 >60 mL/min/1.73m2   CBC with platelets and differential    Collection Time: 05/03/22  1:35 PM   Result Value Ref Range    WBC Count 5.8 4.0 - 11.0 10e3/uL    RBC Count 5.25 4.40 - 5.90 10e6/uL    Hemoglobin 16.0 13.3 - 17.7 g/dL    Hematocrit 46.6 40.0 - 53.0 %    MCV 89 78 - 100 fL    MCH 30.5 26.5 - 33.0 pg    MCHC 34.3 31.5 - 36.5 g/dL    RDW 12.1 10.0 - 15.0 %    Platelet Count 154 150 - 450 10e3/uL    % Neutrophils 61 %    % Lymphocytes 24 %    % Monocytes 10 %    % Eosinophils 4 %    % Basophils 1 %    % Immature Granulocytes 0 %    NRBCs per 100 WBC 0 <1 /100    Absolute Neutrophils 3.5 1.6 - 8.3 10e3/uL    Absolute Lymphocytes 1.4 0.8 - 5.3 10e3/uL    Absolute Monocytes 0.6 0.0 - 1.3 10e3/uL    Absolute Eosinophils 0.2 0.0 - 0.7 10e3/uL    Absolute Basophils 0.0 0.0 - 0.2 10e3/uL    Absolute Immature Granulocytes 0.0 <=0.4 10e3/uL    Absolute NRBCs 0.0 10e3/uL   T4 free    Collection Time: 05/03/22  1:35 PM   Result Value Ref Range    Free T4 0.66 (L) 0.76 - 1.46 ng/dL        EKG: appears normal, NSR, atrial fibrillation, rate 63, unchanged from previous tracings    Revised Cardiac Risk Index (RCRI):  The patient has the following serious cardiovascular risks for perioperative complications:   - No serious cardiac risks = 0 points     RCRI Interpretation: 0 points: Class I (very low risk - 0.4% complication rate)      Signed Electronically by: Rosmery Daly MD  Copy of this evaluation report is provided to requesting physician.

## 2022-05-03 NOTE — RESULT ENCOUNTER NOTE
This is likely an incidental finding.  It is small and was also noted on his sinus CT in 2018.  Approximately 10% of the population has this.  No need for further intervention unless he is having significant sinus symptoms.  JS

## 2022-05-07 ENCOUNTER — OFFICE VISIT (OUTPATIENT)
Dept: FAMILY MEDICINE | Facility: OTHER | Age: 62
End: 2022-05-07
Attending: FAMILY MEDICINE
Payer: COMMERCIAL

## 2022-05-07 DIAGNOSIS — Z11.59 ENCOUNTER FOR SCREENING FOR OTHER VIRAL DISEASES: ICD-10-CM

## 2022-05-07 LAB — SARS-COV-2 RNA RESP QL NAA+PROBE: NEGATIVE

## 2022-05-07 PROCEDURE — U0003 INFECTIOUS AGENT DETECTION BY NUCLEIC ACID (DNA OR RNA); SEVERE ACUTE RESPIRATORY SYNDROME CORONAVIRUS 2 (SARS-COV-2) (CORONAVIRUS DISEASE [COVID-19]), AMPLIFIED PROBE TECHNIQUE, MAKING USE OF HIGH THROUGHPUT TECHNOLOGIES AS DESCRIBED BY CMS-2020-01-R: HCPCS

## 2022-05-07 PROCEDURE — U0005 INFEC AGEN DETEC AMPLI PROBE: HCPCS

## 2022-05-10 ENCOUNTER — ANESTHESIA EVENT (OUTPATIENT)
Dept: SURGERY | Facility: AMBULATORY SURGERY CENTER | Age: 62
End: 2022-05-10
Payer: COMMERCIAL

## 2022-05-11 ENCOUNTER — HOSPITAL ENCOUNTER (OUTPATIENT)
Facility: AMBULATORY SURGERY CENTER | Age: 62
Discharge: HOME | End: 2022-05-11
Attending: OPHTHALMOLOGY
Payer: COMMERCIAL

## 2022-05-11 ENCOUNTER — ANESTHESIA (OUTPATIENT)
Dept: SURGERY | Facility: AMBULATORY SURGERY CENTER | Age: 62
End: 2022-05-11
Payer: COMMERCIAL

## 2022-05-11 VITALS
RESPIRATION RATE: 14 BRPM | DIASTOLIC BLOOD PRESSURE: 95 MMHG | OXYGEN SATURATION: 95 % | HEART RATE: 88 BPM | SYSTOLIC BLOOD PRESSURE: 140 MMHG | TEMPERATURE: 97.1 F

## 2022-05-11 DIAGNOSIS — E05.00 GRAVES DISEASE: Primary | ICD-10-CM

## 2022-05-11 DIAGNOSIS — Z98.890 POSTOPERATIVE EYE STATE: ICD-10-CM

## 2022-05-11 PROCEDURE — 67445 EXPLR/DECOMPRESS EYE SOCKET: CPT | Mod: LT | Performed by: OPHTHALMOLOGY

## 2022-05-11 PROCEDURE — 67445 EXPLR/DECOMPRESS EYE SOCKET: CPT | Mod: LT

## 2022-05-11 RX ORDER — FENTANYL CITRATE 50 UG/ML
INJECTION, SOLUTION INTRAMUSCULAR; INTRAVENOUS PRN
Status: DISCONTINUED | OUTPATIENT
Start: 2022-05-11 | End: 2022-05-11

## 2022-05-11 RX ORDER — CEPHALEXIN 500 MG/1
500 CAPSULE ORAL EVERY 6 HOURS
Qty: 28 CAPSULE | Refills: 0 | Status: SHIPPED | OUTPATIENT
Start: 2022-05-11 | End: 2022-05-11

## 2022-05-11 RX ORDER — PROPOFOL 10 MG/ML
INJECTION, EMULSION INTRAVENOUS PRN
Status: DISCONTINUED | OUTPATIENT
Start: 2022-05-11 | End: 2022-05-11

## 2022-05-11 RX ORDER — CEFAZOLIN SODIUM 1 G/3ML
INJECTION, POWDER, FOR SOLUTION INTRAMUSCULAR; INTRAVENOUS PRN
Status: DISCONTINUED | OUTPATIENT
Start: 2022-05-11 | End: 2022-05-11

## 2022-05-11 RX ORDER — OXYCODONE HYDROCHLORIDE 5 MG/1
5 TABLET ORAL EVERY 4 HOURS PRN
Status: DISCONTINUED | OUTPATIENT
Start: 2022-05-11 | End: 2022-05-12 | Stop reason: HOSPADM

## 2022-05-11 RX ORDER — PROPOFOL 10 MG/ML
INJECTION, EMULSION INTRAVENOUS CONTINUOUS PRN
Status: DISCONTINUED | OUTPATIENT
Start: 2022-05-11 | End: 2022-05-11

## 2022-05-11 RX ORDER — SODIUM CHLORIDE, SODIUM LACTATE, POTASSIUM CHLORIDE, CALCIUM CHLORIDE 600; 310; 30; 20 MG/100ML; MG/100ML; MG/100ML; MG/100ML
500 INJECTION, SOLUTION INTRAVENOUS CONTINUOUS
Status: DISCONTINUED | OUTPATIENT
Start: 2022-05-11 | End: 2022-05-11 | Stop reason: HOSPADM

## 2022-05-11 RX ORDER — ONDANSETRON 2 MG/ML
INJECTION INTRAMUSCULAR; INTRAVENOUS PRN
Status: DISCONTINUED | OUTPATIENT
Start: 2022-05-11 | End: 2022-05-11

## 2022-05-11 RX ORDER — NEOMYCIN POLYMYXIN B SULFATES AND DEXAMETHASONE 3.5; 10000; 1 MG/ML; [USP'U]/ML; MG/ML
1 SUSPENSION/ DROPS OPHTHALMIC 4 TIMES DAILY
Qty: 5 ML | Refills: 0 | Status: SHIPPED | OUTPATIENT
Start: 2022-05-11 | End: 2023-08-29

## 2022-05-11 RX ORDER — DEXAMETHASONE SODIUM PHOSPHATE 4 MG/ML
INJECTION, SOLUTION INTRA-ARTICULAR; INTRALESIONAL; INTRAMUSCULAR; INTRAVENOUS; SOFT TISSUE PRN
Status: DISCONTINUED | OUTPATIENT
Start: 2022-05-11 | End: 2022-05-11

## 2022-05-11 RX ORDER — TETRACAINE HYDROCHLORIDE 5 MG/ML
SOLUTION OPHTHALMIC PRN
Status: DISCONTINUED | OUTPATIENT
Start: 2022-05-11 | End: 2022-05-11 | Stop reason: HOSPADM

## 2022-05-11 RX ORDER — ERYTHROMYCIN 5 MG/G
OINTMENT OPHTHALMIC PRN
Status: DISCONTINUED | OUTPATIENT
Start: 2022-05-11 | End: 2022-05-11 | Stop reason: HOSPADM

## 2022-05-11 RX ORDER — LIDOCAINE HYDROCHLORIDE AND EPINEPHRINE 10; 10 MG/ML; UG/ML
INJECTION, SOLUTION INFILTRATION; PERINEURAL PRN
Status: DISCONTINUED | OUTPATIENT
Start: 2022-05-11 | End: 2022-05-11 | Stop reason: HOSPADM

## 2022-05-11 RX ORDER — ERYTHROMYCIN 5 MG/G
OINTMENT OPHTHALMIC
Qty: 7 G | Refills: 0 | Status: SHIPPED | OUTPATIENT
Start: 2022-05-11 | End: 2023-02-21

## 2022-05-11 RX ORDER — OXYCODONE HYDROCHLORIDE 5 MG/1
5 TABLET ORAL EVERY 6 HOURS PRN
Qty: 12 TABLET | Refills: 0 | Status: SHIPPED | OUTPATIENT
Start: 2022-05-11 | End: 2022-05-14

## 2022-05-11 RX ORDER — LIDOCAINE HYDROCHLORIDE 20 MG/ML
INJECTION, SOLUTION INFILTRATION; PERINEURAL PRN
Status: DISCONTINUED | OUTPATIENT
Start: 2022-05-11 | End: 2022-05-11

## 2022-05-11 RX ADMIN — PROPOFOL 150 MG: 10 INJECTION, EMULSION INTRAVENOUS at 07:43

## 2022-05-11 RX ADMIN — PROPOFOL 50 MG: 10 INJECTION, EMULSION INTRAVENOUS at 07:44

## 2022-05-11 RX ADMIN — DEXAMETHASONE SODIUM PHOSPHATE 10 MG: 4 INJECTION, SOLUTION INTRA-ARTICULAR; INTRALESIONAL; INTRAMUSCULAR; INTRAVENOUS; SOFT TISSUE at 07:49

## 2022-05-11 RX ADMIN — ONDANSETRON 4 MG: 2 INJECTION INTRAMUSCULAR; INTRAVENOUS at 07:52

## 2022-05-11 RX ADMIN — SODIUM CHLORIDE, SODIUM LACTATE, POTASSIUM CHLORIDE, CALCIUM CHLORIDE: 600; 310; 30; 20 INJECTION, SOLUTION INTRAVENOUS at 07:36

## 2022-05-11 RX ADMIN — Medication 100 MCG: at 08:01

## 2022-05-11 RX ADMIN — OXYCODONE HYDROCHLORIDE 5 MG: 5 TABLET ORAL at 08:50

## 2022-05-11 RX ADMIN — Medication 100 MCG: at 07:58

## 2022-05-11 RX ADMIN — PROPOFOL 150 MCG/KG/MIN: 10 INJECTION, EMULSION INTRAVENOUS at 07:43

## 2022-05-11 RX ADMIN — FENTANYL CITRATE 50 MCG: 50 INJECTION, SOLUTION INTRAMUSCULAR; INTRAVENOUS at 07:37

## 2022-05-11 RX ADMIN — CEFAZOLIN SODIUM 2 G: 1 INJECTION, POWDER, FOR SOLUTION INTRAMUSCULAR; INTRAVENOUS at 07:49

## 2022-05-11 RX ADMIN — LIDOCAINE HYDROCHLORIDE 60 MG: 20 INJECTION, SOLUTION INFILTRATION; PERINEURAL at 07:43

## 2022-05-11 RX ADMIN — FENTANYL CITRATE 50 MCG: 50 INJECTION, SOLUTION INTRAMUSCULAR; INTRAVENOUS at 08:09

## 2022-05-11 ASSESSMENT — LIFESTYLE VARIABLES: TOBACCO_USE: 0

## 2022-05-11 ASSESSMENT — COPD QUESTIONNAIRES: COPD: 0

## 2022-05-11 ASSESSMENT — ENCOUNTER SYMPTOMS: ORTHOPNEA: 0

## 2022-05-11 NOTE — ANESTHESIA PREPROCEDURE EVALUATION
Anesthesia Pre-Procedure Evaluation    Patient: Jude Zazueta   MRN: [id number] : 1960        Procedure : Procedure(s):  Left orbital decompression with Sanders navigation and Sonopet  SONOPET EYE          Past Medical History:   Diagnosis Date     Graves disease      HTN (hypertension)       Past Surgical History:   Procedure Laterality Date     COLONOSCOPY  10-    adenoma polyps x 2     PROSTATE CANCER SURGERY        Allergies   Allergen Reactions     Lisinopril Cough     Nifedipine Swelling     Ankle swelling     Pollen Extract       Social History     Tobacco Use     Smoking status: Former Smoker     Packs/day: 1.00     Years: 30.00     Pack years: 30.00     Types: Cigarettes     Smokeless tobacco: Never Used     Tobacco comment: Quit in ; no passive smoke exposure   Substance Use Topics     Alcohol use: Yes     Comment: Occasionally      Wt Readings from Last 1 Encounters:   22 108.9 kg (240 lb)        Anesthesia Evaluation   Pt has had prior anesthetic. Type: General and MAC.    No history of anesthetic complications       ROS/MED HX  ENT/Pulmonary:     (+) sleep apnea, uses CPAP,  (-) tobacco use, asthma, COPD and recent URI   Neurologic:       Cardiovascular: Comment: Takes lasix to manage LE edema 2/2 antihypertensives. No LE edema. Denies hx or symptoms of pulmonary edema.     (+) hypertension-range: 130s/80s/ ---- (-) CHF, RAMOS, orthopnea/PND and syncope   METS/Exercise Tolerance: >4 METS Comment: Works in mine, very active doing hard labor. No fatigue or concerning symptoms.    Hematologic:       Musculoskeletal:       GI/Hepatic:    (-) GERD (Very rare heartburn, has been asymptomatc for a long time now)   Renal/Genitourinary:       Endo:     (+) thyroid problem,  hyperthyroidism,     Psychiatric/Substance Use:       Infectious Disease:       Malignancy:       Other:            Physical Exam    Airway        Mallampati: II   TM distance: > 3 FB   Neck ROM: full   Mouth  opening: > 3 cm    Respiratory Devices and Support         Dental     Comment: Missing left lower molar    (+) missing    B=Bridge, C=Chipped, L=Loose, M=Missing    Cardiovascular          Rhythm and rate: regular and normal     Pulmonary           breath sounds clear to auscultation           OUTSIDE LABS:  CBC:   Lab Results   Component Value Date    WBC 5.8 05/03/2022    HGB 16.0 05/03/2022    HCT 46.6 05/03/2022     05/03/2022     BMP:   Lab Results   Component Value Date     05/03/2022     11/09/2021    POTASSIUM 3.9 05/03/2022    POTASSIUM 3.6 11/09/2021    CHLORIDE 109 05/03/2022    CHLORIDE 111 (H) 11/09/2021    CO2 26 05/03/2022    CO2 27 11/09/2021    BUN 23 05/03/2022    BUN 17 11/09/2021    CR 1.20 05/03/2022    CR 1.24 11/09/2021    GLC 95 05/03/2022    GLC 93 11/09/2021     COAGS: No results found for: PTT, INR, FIBR  POC: No results found for: BGM, HCG, HCGS  HEPATIC: No results found for: ALBUMIN, PROTTOTAL, ALT, AST, GGT, ALKPHOS, BILITOTAL, BILIDIRECT, JEWEL  OTHER:   Lab Results   Component Value Date    ADRY 8.7 05/03/2022    TSH 4.54 (H) 05/03/2022    T4 0.66 (L) 05/03/2022       Anesthesia Plan    ASA Status:  3   NPO Status:  NPO Appropriate    Anesthesia Type: General.     - Airway: LMA   Induction: Intravenous.   Maintenance: Balanced.        Consents    Anesthesia Plan(s) and associated risks, benefits, and realistic alternatives discussed. Questions answered and patient/representative(s) expressed understanding.    - Discussed:     - Discussed with:  Patient         Postoperative Care    Pain management: IV analgesics, Oral pain medications.   PONV prophylaxis: Ondansetron (or other 5HT-3), Dexamethasone or Solumedrol, Background Propofol Infusion     Comments:    Other Comments: Discussed Graves disease & management. Last TSH and FT4 for H&P showed elevated TSH, low FT4. Per H&P, told to proceed and [facility] managing his [facility] medications later.  Denies any symptoms of hypothyroidism, and remains very active (working in mine, moving heavy objects, up flights of stars) without any new symptoms or fatigue. Given excellent functional status, and lack of clear risks with asymptomatic hypothyroidism, we discussed possible increased risk of hypotension. Discussed with patient, wife and surgeon who would like to proceed.    Discussed risks of general anesthesia, including aspiration pneumonia, sore throat/hoarse voice, abrasions/damage to lips/tongue/teeth, nausea, rare complications (including medication reactions, cardiac, pulmonary, hypoxia/low oxygen, recall). Ensured understanding, invited questions and all questions were answered. Patient wishes to proceed.       H&P reviewed: Unable to attach H&P to encounter due to EHR limitations. H&P Update: appropriate H&P reviewed, patient examined. No interval changes since H&P (within 30 days).         Malinda Caicedo MD

## 2022-05-11 NOTE — ANESTHESIA POSTPROCEDURE EVALUATION
Patient: Jude Zazueta    Procedure: Procedure(s):  Left orbital decompression and Sonopet Left Eye  SONOPET EYE       Anesthesia Type:  General    Note:  Disposition: Outpatient   Postop Pain Control: Uneventful            Sign Out: Well controlled pain   PONV: No   Neuro/Psych: Uneventful            Sign Out: Acceptable/Baseline neuro status   Airway/Respiratory: Uneventful            Sign Out: Acceptable/Baseline resp. status   CV/Hemodynamics: Uneventful            Sign Out: Acceptable CV status; No obvious hypovolemia; No obvious fluid overload   Other NRE: NONE   DID A NON-ROUTINE EVENT OCCUR? No    Event details/Postop Comments:  Doing well. Alert, oriented. No sore throat, nausea, or problems with pain control. Hemodynamically stable. Patient denies any concerns.              Last vitals:  Vitals Value Taken Time   /84 05/11/22 0850   Temp 36.1  C (97  F) 05/11/22 0850   Pulse     Resp 14 05/11/22 0850   SpO2 94 % 05/11/22 0850       Electronically Signed By: Malinda Caicedo MD  May 11, 2022  10:37 AM

## 2022-05-11 NOTE — ANESTHESIA CARE TRANSFER NOTE
Patient: Jude Zazueta    Procedure: Procedure(s):  Left orbital decompression and Sonopet Left Eye  SONOPET EYE       Diagnosis: Thyroid eye disease [E05.00]  Diagnosis Additional Information: No value filed.    Anesthesia Type:   No value filed.     Note:    Oropharynx: spontaneously breathing  Level of Consciousness: awake  Oxygen Supplementation: room air    Independent Airway: airway patency satisfactory and stable  Dentition: dentition unchanged  Vital Signs Stable: post-procedure vital signs reviewed and stable  Report to RN Given: handoff report given  Patient transferred to: PACU  Comments: Resps easy and regular. Report to PACU RN  Handoff Report: Identifed the Patient, Identified the Reponsible Provider, Reviewed the pertinent medical history, Discussed the surgical course, Reviewed Intra-OP anesthesia mangement and issues during anesthesia, Set expectations for post-procedure period and Allowed opportunity for questions and acknowledgement of understanding      Vitals:  Vitals Value Taken Time   /84 05/11/22 0850   Temp 36.1  C (97  F) 05/11/22 0850   Pulse     Resp 14 05/11/22 0850   SpO2 94 % 05/11/22 0850       Electronically Signed By: MARIO ZEE CRNA  May 11, 2022  8:54 AM

## 2022-05-11 NOTE — OR NURSING
Dr. Herrmann from anesthesia aware of patient's blood pressure. Ok to proceed into OR with no interventions at this time per Dr. Herrmann.     Ling Jaimes RN

## 2022-05-11 NOTE — OP NOTE
PREOPERATIVE DIAGNOSIS: Thyroid eye disease with exophthalmos, left eye.   POSTOPERATIVE DIAGNOSIS: Thyroid eye disease with exophthalmos, left eye.   PROCEDURE:Left lateral wall bone and fat orbital decompression.   SURGEON: Marko Ledezma MD   ASSISTANTS: Bouchra Foreman MD and Baldev Douglas MD   ANESTHESIA: General with local infiltration of 1% lidocaine with epinephrine 1:[id number].   COMPLICATIONS: None.   ESTIMATED BLOOD LOSS: Less than 20 mL.   SPECIMENS: None.   HISTORY AND INDICATIONS: Jude Zazueta  presented with severe exophthalmos secondary to thyroid eye disease with exposure keratitis. After the risks, benefits and alternatives to the proposed procedure were explained, informed consent was obtained.   DESCRIPTION OF PROCEDURE: Jude Zazueta  was brought to the operating room and placed supine on the operating table. General anesthesia was induced.  The left lateral canthal area was infiltrated with local anesthetic and the area was prepped and draped in the typical sterile ophthalmic fashion.  Attention was directed to the left side. Lateral canthal incision was made with a 15 blade and dissection carried down through the orbicularis with monopolar cautery. Lateral canthotomy and inferior cantholysis was performed. Dissection was carried along the orbital rim. A malleable retractor was used to protect the globe and the intraorbital contents and the periorbita elevated from the lateral orbital wall. Hemostasis was obtained with monopolar cautery. The lateral orbital wall was then removed using  the Sonopet.  The bone removal started just inside the lateral orbital rim and extended back to the thick bone of the trigone. The bone removal continued superiorly to the lacrimal fossa and inferiorly to the basin of the infraorbital fissure.  Hemostasis was obtained with monopolar cautery and bone wax throughout this procedure. The inferotemporal periorbita was widely opened and the intraconal fat removed  using monopolar cautery. Again, hemostasis was obtained, the superior and inferior zarina of lateral canthal tendon were secured to the lateral orbital rim periosteum using a 5-0 Vicryl suture. The deep tissues were closed with interrupted buried 5-0 Vicryl suture. The skin was closed with running 6-0 plain gut suture. Ophthalmic antibiotic ointment was applied to the incision and into the eye. The patient tolerated the procedure well. Jude Zazueta  left the operating room in stable condition after being awoken after being awoken from the general anesthetic.     TONY PATHAK MD

## 2022-05-11 NOTE — ANESTHESIA PROCEDURE NOTES
Airway       Patient location during procedure: OR  Staff -        CRNA: Addis Cook APRN CRNA       Performed By: CRNAIndications and Patient Condition       Indications for airway management: abdirashid-procedural       Induction type:intravenous       Mask difficulty assessment: 1 - vent by mask    Final Airway Details       Final airway type: supraglottic airway    Supraglottic Airway Details        Type: LMA       Brand: LMA Unique       LMA size: 5    Post intubation assessment        Placement verified by: capnometry and chest rise        Number of attempts at approach: 1       Number of other approaches attempted: 0       Secured with: silk tape       Ease of procedure: easy       Dentition: Intact and Unchanged

## 2022-05-11 NOTE — BRIEF OP NOTE
[facility]    Brief Operative Note    Pre-operative diagnosis: Thyroid eye disease [E05.00]  Post-operative diagnosis Same as pre-operative diagnosis    Procedure: Procedure(s):  Left orbital decompression and Sonopet Left Eye  SONOPET EYE  Surgeon: Surgeon(s) and Role:     * Marko Ledezma MD - Primary   Bouchra Foreman MD - fellow  Baldev Douglas MD - resident   Anesthesia: General   Estimated Blood Loss: Minimal    Drains: None  Specimens: * No specimens in log *  Findings:   as expected .  Complications: None.  Implants: * No implants in log *

## 2022-05-11 NOTE — DISCHARGE INSTRUCTIONS
Post-operative Instructions    Ophthalmic Plastic and Reconstructive Surgery  Mrako Ledezma M.D.    All instructions apply to the operated eye(s) or eyelid(s)    What to expect after surgery:  There will be some swelling, bruising, and likely a black eye (even into the lower eyelids and cheeks). Also expect crusting and discharge from the eye and/or incisions.   A small amount of surface bleeding is normal for the first 48 hours after surgery.  You may notice some bloody tears for the first few days after surgery. This is normal.  Your eye(s) and eyelid(s) may be painful and tender. This is normal after surgery. Use the pain medication as prescribed. If your pain does not improve despite the medication, contact the office.    Wound care and personal care:  If a patch or bandage has been placed, please leave this in place until seen in clinic. Prevent the bandage from getting wet.   Apply ice compresses 15 minutes on 15 minutes off while awake for the first 2 days after surgery, then switch to warm compresses 4 times a day until seen by your physician.   For warm packs you can place a cup of dry uncooked rice in a clean cotton sock. Place sock in microwave 30 seconds to one minute. Next place the warm sock into a plastic bag and wrap the bag with clean warm wet washcloth and place over operated eye.    You may shower or wash your hair the day after surgery. Do not bathe or go swimming for 1 week to prevent contamination of your wounds.  Do not apply make-up to the eyes or eyelids for 2 weeks after surgery.    Activity restrictions and driving:  Avoid heavy lifting, bending, exercise or strenuous activity for 1 week after surgery.  You may resume other activities and return to work as tolerated.  You may not resume driving until have you stopped using narcotic pain medications(such as Norco, Percocet, Tylenol #3).    Medications:  Restart all your regular home medications and eye drops today. If you take Plavix  or Aspirin on a regular basis, wait for 3 days after your surgery before restarting these in order to decrease the risk of bleeding complications.  Avoid aspirin and aspirin-like medications (Motrin, Aleve, Ibuprofen, Emily-Kenduskeag etc) for 5 days to reduce the risk of bleeding. You may take Tylenol (acetaminophen) for pain.  In addition to your home medications, take the following post-operative medications as prescribed by your physician:  Apply antibiotic ointment (erythromycin) to all sutures three times a day, and into the operated eye(s) at night.  Instill eye drops (Maxitrol) four times a day into the operated eye(s) until the bottle finished.   Take 1 pain pill (oxycodone as prescribed) as needed for pain up to every 6 hours.  The pain pills may make you drowsy. You must not drive a car, operate heavy machinery or drink alcohol while taking them.  The pain pills may cause constipation and nausea. Take them with some food to prevent a stomach upset. If you continue to experience nausea, call your physician.    WARNING: All the prescription pain medications listed above contain Tylenol (acetaminophen). You must not take more than 4,000 mg of acetaminophen per 24-hour period. This is equivalent to 6 tablets of Darvocet, 8 tablets of Vicodin, or 12 tablets of Norco, Percocet or Tylenol #3. If you take other over-the-counter medications containing acetaminophen, you must take the amount of acetaminophen into account and reduce the number of prescribed pain pills accordingly.    Contact information and follow-up:  Return to the Eye Clinic for a follow-up appointment with your physician as  scheduled. If no appointment has been scheduled, call [phone] for an  appointment with Dr. Ledezma within 1 to 2 weeks from your date of surgery.  -     Please email a few photos of your eye(s) or other operative site(s) to [email or web address] prior to your follow up visit.    For severe pain, bleeding, or loss of  "vision, call the Eye Clinic at [phone].  After hours or on weekends and holidays, call [phone] and ask to speak with the ophthalmologist on call.    [facility]  Home Care Following Anesthesia  For 24 hours after surgery:  Get plenty of rest.  A responsible adult must stay with you for at least 24 hours after you leave the surgery center.  Do not drive or use heavy equipment.  If you have weakness or tingling, don't drive or use heavy equipment until this feeling goes away.   Do not drink alcohol.   Avoid strenuous or risky activities.  Ask for help when climbing stairs.  You may feel lightheaded.  IF so, sit for a few minutes before standing.  Have someone help you get up.   If you have nausea (feel sick to your stomach): Drink only clear liquids such as apple juice, ginger ale, broth or 7-Up.  Rest may also help.  Be sure to drink enough fluids.  Move to a regular diet as you feel able.   You may have a slight fever.  Call the doctor if your fever is over 100 F (37.7 C) (taken under the tongue) or lasts longer than 24 hours.  You may have a dry mouth, a sore throat, muscle aches or trouble sleeping. These should go away after 24 hours.  Do not make important or legal decisions.   It is recommended to avoid smoking.        Today you received a Marcaine or bupivacaine block to numb the nerves near your surgery site.  This is a block using local anesthetic or \"numbing\" medication injected around the nerves to anesthetize or \"numb\" the area supplied by those nerves.  This block is injected into the muscle layer near your surgical site.  The medication may numb the location where you had surgery for 6-18 hours, but may last up to 24 hours.  If your surgical site is an arm or leg you should be careful with your affected limb, since it is possible to injure your limb without being aware of it due to the numbing.  Until full feeling returns, you should guard against bumping " or hitting your limb, and avoid extreme hot or cold temperatures on the skin.  As the block wears off, the feeling will return as a tingling or prickly sensation near your surgical site.  You will experience more discomfort from your incision as the feeling returns.  You may want to take a pain pill (a narcotic or Tylenol if this was prescribed by your surgeon) when you start to experience mild pain before the pain beccomes more severe.  If your pain medications do not control your pain you should notifiy your surgeon.    Tips for taking pain medications  To get the best pain relief possible, remember these points:  Take pain medications as directed, before pain becomes severe.  Pain medication can upset your stomach: taking it with food may help.  Constipation is a common side effect of pain medication. Drink plenty of  fluids.  Eat foods high in fiber. Take a stool softener if recommended by your doctor or pharmacist.  Do not drink alcohol, drive or operate machinery while taking pain medications.  Ask about other ways to control pain, such as with heat, ice or relaxation.    Tylenol/Acetaminophen Consumption  To help encourage the safe use of acetaminophen, the makers of TYLENOL  have lowered the maximum daily dose for single-ingredient Extra Strength TYLENOL  (acetaminophen) products sold in the [place] from 8 pills per day (4,000 mg) to 6 pills per day (3,000 mg). The dosing interval has also changed from 2 pills every 4-6 hours to 2 pills every 6 hours.  If you feel your pain relief is insufficient, you may take Tylenol/Acetaminophen in addition to your narcotic pain medication.   Be careful not to exceed 3,000 mg of Tylenol/Acetaminophen in a 24 hour period from all sources.  If you are taking extra strength Tylenol/acetaminophen (500 mg), the maximum dose is 6 tablets in 24 hours.  If you are taking regular strength acetaminophen (325 mg), the maximum dose is 9 tablets in 24 hours.    Call a doctor for any of  the following:  Signs of infection (fever, growing tenderness at the surgery site, a large amount of drainage or bleeding, severe pain, foul-smelling drainage, redness, swelling).  It has been over 8 to 10 hours since surgery and you are still not able to urinate (pass water).  Headache for over 24 hours.  Numbness, tingling or weakness the day after surgery (if you had spinal anesthesia).  Signs of Covid-19 infection (temperature over 100 degrees, shortness of breath, cough, loss of taste/smell, generalized body aches, persistent headache, chills, sore throat, nausea/vomiting/diarrhea)  Your doctor is:  Dr. Marko Ledezma, Ophthalmology: [phone]                    Or dial [phone] and ask for the resident on call for:  Ophthalmology  For emergency care, call the:  [facility]:  [phone] (TTY for hearing impaired: [phone])

## 2022-05-12 NOTE — PROGRESS NOTES
Telephone call to Jude Zazueta    Doing well with no pain, good vision, and no bleeding. All questions were answered, he is doing well, and postoperative care was reviewed.  A postop appointment has been scheduled.    Bouchra Foreman MD

## 2022-05-23 ENCOUNTER — OFFICE VISIT (OUTPATIENT)
Dept: OPHTHALMOLOGY | Facility: CLINIC | Age: 62
End: 2022-05-23
Payer: COMMERCIAL

## 2022-05-23 DIAGNOSIS — E05.00 THYROTOXICOSIS WITH DIFFUSE GOITER WITHOUT THYROTOXIC CRISIS OR STORM: ICD-10-CM

## 2022-05-23 PROCEDURE — 99024 POSTOP FOLLOW-UP VISIT: CPT | Performed by: OPHTHALMOLOGY

## 2022-05-23 ASSESSMENT — CONF VISUAL FIELD
OS_NORMAL: 1
OD_NORMAL: 1

## 2022-05-23 ASSESSMENT — EXTERNAL EXAM - RIGHT EYE: OD_EXAM: NORMAL

## 2022-05-23 ASSESSMENT — TONOMETRY
OS_IOP_MMHG: 24
IOP_METHOD: ICARE
OD_IOP_MMHG: 23

## 2022-05-23 ASSESSMENT — VISUAL ACUITY
METHOD: SNELLEN - LINEAR
OS_CC: 20/20
OD_CC: 20/25

## 2022-05-23 ASSESSMENT — SLIT LAMP EXAM - LIDS
COMMENTS: NORMAL
COMMENTS: INCISIONS CLEAN/DRY/INTACT

## 2022-05-23 ASSESSMENT — EXTERNAL EXAM - LEFT EYE: OS_EXAM: PERIORBITAL EDEMA

## 2022-05-23 NOTE — PROGRESS NOTES
Chief Complaints and History of Present Illnesses   Patient presents with     Follow Up For Surgery Of Eye     Chief Complaint(s) and History of Present Illness(es)     Follow Up For Surgery Of Eye     Pain was noted as 5/10.              Comments     Left lateral decompression with loretta morgan and Sonopet. Patient has no   complaints at this time. Patient states a lot of swelling that is not   going down as fast as he would like. Patient states pain around a 5.   Patient denies vision issues.    ROSALINE Greer May 23, 2022 12:12 PM                    Assessment & Plan     Jude Zazueta is a 62 year old male with the following diagnoses:   1. Thyrotoxicosis with diffuse goiter without thyrotoxic crisis or storm       S/p decompression 5/11/2022      Continue antibiotic ointment or bland lubricating ointment (eg vaseline or aquaphor) to the incision(s) two times a day.    Gently massage along the incision(s) two times a day.    Use warm soaks over the incision(s) four times a day until swelling and bruises resolve.    Return to clinic 2 months            Attending Physician Attestation:  Complete documentation of historical and exam elements from today's encounter can be found in the full encounter summary report (not reduplicated in this progress note).  I personally obtained the chief complaint(s) and history of present illness.  I confirmed and edited as necessary the review of systems, past medical/surgical history, family history, social history, and examination findings as documented by others; and I examined the patient myself.  I personally reviewed the relevant tests, images, and reports as documented above.  I formulated and edited as necessary the assessment and plan and discussed the findings and management plan with the patient and family. I personally reviewed the ophthalmic test(s) associated with this encounter, and have completed the corresponding report(s) as necessary.   -Marko Ledezma,  MD

## 2022-05-23 NOTE — NURSING NOTE
Chief Complaints and History of Present Illnesses   Patient presents with     Follow Up For Surgery Of Eye     Chief Complaint(s) and History of Present Illness(es)     Follow Up For Surgery Of Eye     Pain scale: 5/10              Comments     Left lateral decompression with loretta morgan and Sonopet. Patient has no complaints at this time. Patient states a lot of swelling that is not going down as fast as he would like. Patient states pain around a 5. Patient denies vision issues.    Tamiko Leon, ROSALINE May 23, 2022 12:12 PM

## 2022-05-23 NOTE — PATIENT INSTRUCTIONS
Continue antibiotic ointment or bland lubricating ointment (eg vaseline or aquaphor) to the incision(s) two times a day.  Gently massage along the incision(s) two times a day.  Use warm soaks over the incision(s) four times a day until swelling and bruises resolve.

## 2022-08-08 ENCOUNTER — OFFICE VISIT (OUTPATIENT)
Dept: OPHTHALMOLOGY | Facility: CLINIC | Age: 62
End: 2022-08-08
Payer: COMMERCIAL

## 2022-08-08 DIAGNOSIS — H53.2 BINOCULAR VISION DISORDER WITH DIPLOPIA: ICD-10-CM

## 2022-08-08 DIAGNOSIS — E05.00 THYROTOXICOSIS WITH DIFFUSE GOITER WITHOUT THYROTOXIC CRISIS OR STORM: Primary | ICD-10-CM

## 2022-08-08 PROCEDURE — 99024 POSTOP FOLLOW-UP VISIT: CPT | Mod: GC | Performed by: OPHTHALMOLOGY

## 2022-08-08 PROCEDURE — 92285 EXTERNAL OCULAR PHOTOGRAPHY: CPT | Mod: GC | Performed by: OPHTHALMOLOGY

## 2022-08-08 ASSESSMENT — EXTERNAL EXAM - LEFT EYE: OS_EXAM: PROPTOSIS IMPROVED

## 2022-08-08 ASSESSMENT — VISUAL ACUITY
OS_SC: 20/20
METHOD: SNELLEN - LINEAR
OD_SC: 20/25
OD_SC+: -2

## 2022-08-08 ASSESSMENT — TONOMETRY
OS_IOP_MMHG: 21
OD_IOP_MMHG: 21
IOP_METHOD: ICARE

## 2022-08-08 ASSESSMENT — EXTERNAL EXAM - RIGHT EYE: OD_EXAM: PROPTOSIS

## 2022-08-08 ASSESSMENT — SLIT LAMP EXAM - LIDS
COMMENTS: DERMATOCHALASIS
COMMENTS: DERMATOCHALASIS

## 2022-08-08 NOTE — NURSING NOTE
Chief Complaints and History of Present Illnesses   Patient presents with     Follow Up For Surgery Of Eye     Chief Complaint(s) and History of Present Illness(es)     Follow Up For Surgery Of Eye     Laterality: left eye    Associated symptoms: double vision.  Negative for eye pain, flashes and floaters              Comments     Patient states when he holds his head straight and looks all the way to the right he sees double. Patient denies pain but states numbness temporal side of face near left eye. Patient has travel paperwork he would like signed.     Tamiko Leon, ROSALINE August 8, 2022 12:20 PM

## 2022-08-08 NOTE — PROGRESS NOTES
Chief Complaints and History of Present Illnesses   Patient presents with     Follow Up For Surgery Of Eye     Chief Complaint(s) and History of Present Illness(es)     Follow Up For Surgery Of Eye     In left eye.  Associated symptoms include double vision.  Negative for   eye pain, flashes and floaters.              Comments     Patient states when he holds his head straight and looks all the way to   the right he sees double. Patient denies pain but states numbness temporal   side of face near left eye. Patient has travel paperwork he would like   signed.     ROSALINE Greer August 8, 2022 12:20 PM                        Assessment & Plan     Jude Zazueta is a 62 year old male with the following diagnoses:   1. Thyrotoxicosis with diffuse goiter without thyrotoxic crisis or storm    2. Binocular vision disorder with diplopia         S/p left lateral wall decompression 5/11/22  - pt notes binocular diagonal diplopia in right gaze since surgery  - concern for left inferior oblique underaction  - follow up with Dr. Carrillo/Gi for strabismus measurements in Oct Thyroid eye disease clinic  - will wait at least 6 months after surgery to see if strabismus measurements improve/resolve           Bouchra Foreman MD  Oculoplastics Fellow    Attending Physician Attestation:  I have seen and examined this patient with the fellow .  I have confirmed and edited as necessary the chief complaint(s), history of present illness, review of systems, relevant history, and examination findings as documented by others.  I have personally reviewed the relevant tests, images, and reports as documented above.  I have confirmed and edited as necessary the assessment and plan and agree with this note.    - Marko Ledezma MD 1:02 PM 8/8/2022

## 2022-09-08 ENCOUNTER — TRANSFERRED RECORDS (OUTPATIENT)
Dept: HEALTH INFORMATION MANAGEMENT | Facility: CLINIC | Age: 62
End: 2022-09-08

## 2022-09-08 LAB
ALT SERPL-CCNC: 17 U/L (ref 13–61)
AST SERPL-CCNC: 21 U/L (ref 15–37)
CHOLESTEROL (EXTERNAL): 152 MG/DL
CREATININE (EXTERNAL): 1.3 MG/DL (ref 0.7–1.2)
GLUCOSE (EXTERNAL): 106 MG/DL (ref 74–106)
HDLC SERPL-MCNC: 36 MG/DL
LDL CHOLESTEROL CALCULATED (EXTERNAL): 97 MG/DL
NON HDL CHOLESTEROL (EXTERNAL): 116 MG/DL
POTASSIUM (EXTERNAL): 4.1 MMOL/L (ref 3.5–5)
TRIGLYCERIDES (EXTERNAL): 94 MG/DL

## 2022-09-12 ENCOUNTER — TRANSFERRED RECORDS (OUTPATIENT)
Dept: HEALTH INFORMATION MANAGEMENT | Facility: CLINIC | Age: 62
End: 2022-09-12

## 2022-10-06 ENCOUNTER — HOSPITAL ENCOUNTER (OUTPATIENT)
Dept: CT IMAGING | Facility: HOSPITAL | Age: 62
Discharge: HOME | End: 2022-10-06
Attending: FAMILY MEDICINE | Admitting: FAMILY MEDICINE
Payer: COMMERCIAL

## 2022-10-06 DIAGNOSIS — Z12.2 ENCOUNTER FOR SCREENING FOR MALIGNANT NEOPLASM OF RESPIRATORY ORGANS: ICD-10-CM

## 2022-10-06 PROCEDURE — 71250 CT THORAX DX C-: CPT

## 2022-10-16 ENCOUNTER — HEALTH MAINTENANCE LETTER (OUTPATIENT)
Age: 62
End: 2022-10-16

## 2022-10-17 ENCOUNTER — TELEPHONE (OUTPATIENT)
Dept: OPHTHALMOLOGY | Facility: CLINIC | Age: 62
End: 2022-10-17

## 2022-10-17 NOTE — TELEPHONE ENCOUNTER
[facility] Call Center    Phone Message    May a detailed message be left on voicemail: yes     Reason for Call: Other: Additional referral request information     Patient called back and was able to provide some additional information. Patient states he will need Dr. Carrillo and Dr. Ledezma to send a request for continuing care and a referral # for billing for purposes to primary [facility] [place], [place] Their fax # is [phone].  Patient also provided a phone # for [facility] provider at the [facility] (Dr. Salazar): [phone](phone)    Action Taken: Message routed to:  Other: Peds Eye    Travel Screening: Not Applicable

## 2022-10-17 NOTE — TELEPHONE ENCOUNTER
[facility] Call Center    Phone Message    May a detailed message be left on voicemail: yes     Reason for Call: Other: [facility] request for ongoing care      Patient is scheduled to see Dr. Carrillo again on 10/31/22. Patient states he was told his [facility] authorization for eye care expires on 10/30/22 and he told that our eye clinic needs to send a request for ongoing care to his primary thyroid provider (Dr. Salazar). Patient was unable to provide any specifics on what needs to be included in the request. Patient also did not have contact info for this VA provider at time of call. I encouraged patient to call back with contact information for where this needs to be sent    Action Taken: Message routed to:  Other: Peds Eye    Travel Screening: Not Applicable

## 2022-10-21 ENCOUNTER — TRANSCRIBE ORDERS (OUTPATIENT)
Dept: OTHER | Age: 62
End: 2022-10-21

## 2022-10-21 DIAGNOSIS — H05.20 UNSPECIFIED EXOPHTHALMOS: Primary | ICD-10-CM

## 2022-10-31 ENCOUNTER — OFFICE VISIT (OUTPATIENT)
Dept: OPHTHALMOLOGY | Facility: CLINIC | Age: 62
End: 2022-10-31
Attending: OPHTHALMOLOGY
Payer: COMMERCIAL

## 2022-10-31 DIAGNOSIS — H50.22 HYPOTROPIA OF LEFT EYE: Primary | ICD-10-CM

## 2022-10-31 DIAGNOSIS — H11.232 SYMBLEPHARON OF LEFT EYE: ICD-10-CM

## 2022-10-31 DIAGNOSIS — H05.20 UNSPECIFIED EXOPHTHALMOS: ICD-10-CM

## 2022-10-31 PROCEDURE — 99214 OFFICE O/P EST MOD 30 MIN: CPT | Mod: GC | Performed by: OPHTHALMOLOGY

## 2022-10-31 PROCEDURE — 92060 SENSORIMOTOR EXAMINATION: CPT | Mod: 26 | Performed by: OPHTHALMOLOGY

## 2022-10-31 PROCEDURE — G0463 HOSPITAL OUTPT CLINIC VISIT: HCPCS | Mod: 25

## 2022-10-31 PROCEDURE — 92060 SENSORIMOTOR EXAMINATION: CPT | Performed by: OPHTHALMOLOGY

## 2022-10-31 ASSESSMENT — CONF VISUAL FIELD
OD_NORMAL: 1
OD_INFERIOR_NASAL_RESTRICTION: 0
OD_INFERIOR_TEMPORAL_RESTRICTION: 0
OS_NORMAL: 1
OD_SUPERIOR_NASAL_RESTRICTION: 0
OS_INFERIOR_TEMPORAL_RESTRICTION: 0
OS_SUPERIOR_NASAL_RESTRICTION: 0
OS_SUPERIOR_TEMPORAL_RESTRICTION: 0
METHOD: COUNTING FINGERS
OS_INFERIOR_NASAL_RESTRICTION: 0
OD_SUPERIOR_TEMPORAL_RESTRICTION: 0

## 2022-10-31 ASSESSMENT — SLIT LAMP EXAM - LIDS
COMMENTS: DERMATOCHALASIS
COMMENTS: DERMATOCHALASIS

## 2022-10-31 ASSESSMENT — VISUAL ACUITY
METHOD: SNELLEN - LINEAR
OS_SC: 20/20
OD_SC: 20/20

## 2022-10-31 ASSESSMENT — TONOMETRY
IOP_METHOD: ICARE
OS_IOP_MMHG: 20
OD_IOP_MMHG: 22

## 2022-10-31 ASSESSMENT — EXTERNAL EXAM - RIGHT EYE: OD_EXAM: PROPTOSIS

## 2022-10-31 ASSESSMENT — EXTERNAL EXAM - LEFT EYE: OS_EXAM: PROPTOSIS IMPROVED

## 2022-10-31 NOTE — PROGRESS NOTES
Assessment & Plan     HPI: Diagnosed with Grave's disease in 2018 during bout of thyrotoxicosis with diffuse goiter and without thyrotoxic crisis or storm. S/p left lateral wall decompression on 5/11/22 (Dr. Ledezma), and is on methimazole. Main concern today is diplopia. States that dryness is under control. He thinks he looks more normal now after the surgery.     [facility]    Thyroid history:  Diagnosed when? Graves, 2018  LAL: no  Thyroidectomy: no    TSI (date): 5.46 (1 year ago)      Previous results: 3.1 (2 years ago), 2.05 (2 years ago), 1.02 (3 years ago)  -Per patient, There are more recent labs at the [facility] not seen in our EMR    Eye symptoms (since when):   Proptosis (better/worse/same since last visit): better   Diplopia(better/worse/same since last visit): worse (was not present before surgery)  Eyelid retraction(better/worse/same since last visit): not applicable  Tearing(better/worse/same since last visit): not applicable  Redness (better/worse/same since last visit): not applicable  Pain ((better/worse/same since last visit): not applicable  Pain to move the eyes (better/worse/same since last visit): not applicable (has pain with gaze to the right, but this is from lateral decompression)  Blurred vision: not applicable    Ocular history:   Orbital decompression (date, details): Left lateral wall decompression, 5/11/22  Strabismus surgery (date, details): no  Eyelid surgery (date, details): no    Exam:   Joan (base):105 20/21     Better/worse same: slightly better  Strabismus (better/worse/same): worse, new 16 PD exotropia and 9 PD hypertropia in right gaze. Left superior oblique overreaction   Eyelid retraction (better/worse/same): not applicable    SHERIE Score:  1. Spontaneous orbital pain.     no  2. Gaze evoked orbital pain.     No (does have right gaze pain 2/2 surgery, not RICHIE).  3. Eyelid swelling due to active thyroid eye disease  no  4. Eyelid  erythema.      no  5. Conjunctival redness due to active thyroid eye disease . no  6. Chemosis.        no  7. Inflammation of caruncle OR plica.   no    Patients assessed after follow-up can be scored out of 10 by  including items 8-10.    8. Increase of > 2mm in proptosis.    no   9. Decrease in uniocular excursion in any direction of > 8 . yes  10. Decrease of acuity equivalent to 1 Snellen line.  no    SHERIE SCORE = 1/10    Tanner Diplopia Score = 2  0 = no diplopia  1 = intermittent (when tired, upon waking, end of day etc)  2 = inconstant (extreme gazes)  3 = constant      Jude Zazueta is a 62 year old male with the following diagnoses:   1. Hypotropia of left eye    2. Unspecified exophthalmos       -Diagnosed in 2018. Has been in document inactive phase since 2021.  -On methimazole  -s/p left lateral wall orbital decompression 5/2022  -SHERIE continues to be 1, but is now 1 because of double vision and no longer trace chemosis. Developed diplopia in right gaze since his last visit. He states it is new since his surgery 5 months ago. He states it began immediately after surgery. Possible pain with right gaze and double vision in right gaze is residual swelling and muscle bruising limiting gaze and not diplopia related to RICHIE.  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    -Encouraged patient not to smoke  -Not on selenium    PLAN:   Refer to Dr. Purcell for release of scarring.                Attending Physician Attestation:  Complete documentation of historical and exam elements from today's encounter can be found in the full encounter summary report (not reduplicated in this progress note).  I personally obtained the chief complaint(s) and history of present illness.  I confirmed and edited as necessary the review of systems, past medical/surgical history, family history, social history, and examination findings as documented by others; and I examined the patient  myself.  I personally reviewed the relevant tests, images, and reports as documented above.  I formulated and edited as necessary the assessment and plan and discussed the findings and management plan with the patient and family. I personally reviewed the ophthalmic test(s) associated with this encounter, agree with the interpretation(s) as documented by the resident/fellow, and have edited the corresponding report(s) as necessary.  - Harsh Rowan MD  PGY-2 Ophthalmology

## 2022-10-31 NOTE — NURSING NOTE
Chief Complaint(s) and History of Present Illness(es)     Thyroid Disease            Laterality: both eyes    Associated symptoms: double vision.  Negative for eye pain, redness and tearing    Comments: Vision seems good. Occasionally has dryness of the eyes uses AT prn. No redness, tearing or pain with movement.           Diplopia Follow Up            Laterality: both eyes    Associated symptoms: Negative for eye pain    Comments: Had left decompression surgery may 2022. Has been having diplopia since then. Has not gotten worse. Only occurs in right gaze and vertically. No double vision in primary position.           Comments    Inf: pt

## 2022-11-02 ENCOUNTER — OFFICE VISIT (OUTPATIENT)
Dept: OPHTHALMOLOGY | Facility: CLINIC | Age: 62
End: 2022-11-02
Attending: OPHTHALMOLOGY
Payer: COMMERCIAL

## 2022-11-02 DIAGNOSIS — H11.232 SYMBLEPHARON OF LEFT EYE: Primary | ICD-10-CM

## 2022-11-02 DIAGNOSIS — H53.2 DIPLOPIA: ICD-10-CM

## 2022-11-02 PROCEDURE — 99215 OFFICE O/P EST HI 40 MIN: CPT | Performed by: OPHTHALMOLOGY

## 2022-11-02 PROCEDURE — G0463 HOSPITAL OUTPT CLINIC VISIT: HCPCS

## 2022-11-02 ASSESSMENT — VISUAL ACUITY
OS_SC: 20/20
OD_SC: 20/20
METHOD: SNELLEN - LINEAR
OD_SC+: -1

## 2022-11-02 ASSESSMENT — TONOMETRY
IOP_METHOD: ICARE
OD_IOP_MMHG: 20
OS_IOP_MMHG: 18

## 2022-11-02 ASSESSMENT — SLIT LAMP EXAM - LIDS
COMMENTS: DERMATOCHALASIS
COMMENTS: DERMATOCHALASIS

## 2022-11-02 ASSESSMENT — EXTERNAL EXAM - RIGHT EYE: OD_EXAM: PROPTOSIS

## 2022-11-02 ASSESSMENT — EXTERNAL EXAM - LEFT EYE: OS_EXAM: PROPTOSIS IMPROVED

## 2022-11-02 NOTE — PROGRESS NOTES
"CC: Symblepharon evaluation    Referring Provider: Dr. Harsh Carrillo      Initial HPI:    Jude Zazueta 62 year old White male with a history of Grave's disease who recently underwent left lateral wall decompression 5/11/2022 with Dr. Ledezma that was referred for evaluation of symblepharon. Per chart review patient reports diplopia in right gaze that started immediately after his recent surgery. Dr. Carrillo noted a symblepharon at the lateral canthal angle limiting adduction.    Patient notes that for a few weeks after surgery his left eye was puffy and swollen, though as the swelling subsided he noted that when looking to the right he had double vision. He has no double vision when looking straight, and no degradation of clarity of vision. No pain in the eyes, though feels a \"stretching\" sensation when looking to the right. He also notes chronic light sensitivity in both eyes that has been unchanged. No flashes, floaters, curtains.     POHx:  Last eye exam: 10/31/2022    Prior eye surgery/laser/Trauma: Lateral orbital decompression, left 5/11/2022    CTL wearer (Brand soft CTL, RGP, Scleral etc.): no    Glasses (SV. Bifocal, Trifocal, PAL): cheaters    Family Hx of eye disease: None      PMHx:   Past Medical History:   Diagnosis Date     Graves disease      HTN (hypertension)        PSHx:   Past Surgical History:   Procedure Laterality Date     COLONOSCOPY  10-    adenoma polyps x 2     OPTICAL TRACKING SYSTEM ORBITOTOMY Left 5/11/2022    Procedure: Left orbital decompression and Sonopet Left Eye;  Surgeon: Marko Ledezma MD;  Location: [facility] OR     PROSTATE CANCER SURGERY       SONOPET EYE Left 5/11/2022    Procedure: SONOPET EYE;  Surgeon: Marko Ledezma MD;  [place] [facility] [place]       [facility] Taking:  Artificial Tears PRN    Allergies:  Lisinopril  Nifedipine    Assessment:  #Symblepharon, Left eye  - Symblepharon formation from temporal bulbar conj to lateral canthus, likely contributing to " limitation in adduction and double vision in right gaze  - recommend symblepharolysis with conjunctival autograft +/- MMC OS  - discussed risk of surgery, including recurrence of symblepharon  - pt is on eliquis, recommend stopping 3 days prior to surgery  - 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      ALSO SEES PROVIDERS:  Dr. Gerardo Veliz MD  Fellow, Cornea, External Disease and Refractive Surgery  Department of Ophthalmology  [facility]    Attending Physician Attestation:  Complete documentation of historical and exam elements from today's encounter can be found in the full encounter summary report (not reduplicated in this progress note).  I personally obtained the chief complaint(s) and history of present illness.  I confirmed and edited as necessary the review of systems, past medical/surgical history, family history, social history, and examination findings as documented by others; and I examined the patient myself.  I personally reviewed the relevant tests, images, and reports as documented above.  I formulated and edited as necessary the assessment and plan and discussed the findings and management plan with the patient and family. - El Purcell MD    I personally spent great than 40minutes spent on the date of the encounter doing chart review, history and exam, discussion with the patient, documentation, and further activities as noted above. We discussed the complexity of their diagnosis, the need for further information, close monitoring, continued management, and the unknown prognosis for the patient at this time.    El Purcell MD

## 2022-11-02 NOTE — LETTER
"11/2/2022       RE: Jude Zazueta  [address] [place] [address]     Dear Colleague,    Thank you for referring your patient, Jude Zazueta, to the [facility] [place] at [facility]. Please see a copy of my visit note below.    CC: Symblepharon evaluation    Referring Provider: Dr. Harsh Carrillo      Initial HPI:    Jude Zazueta 62 year old White male with a history of Grave's disease who recently underwent left lateral wall decompression 5/11/2022 with Dr. Ledezma that was referred for evaluation of symblepharon. Per chart review patient reports diplopia in right gaze that started immediately after his recent surgery. Dr. Carrillo noted a symblepharon at the lateral canthal angle limiting adduction.    Patient notes that for a few weeks after surgery his left eye was puffy and swollen, though as the swelling subsided he noted that when looking to the right he had double vision. He has no double vision when looking straight, and no degradation of clarity of vision. No pain in the eyes, though feels a \"stretching\" sensation when looking to the right. He also notes chronic light sensitivity in both eyes that has been unchanged. No flashes, floaters, curtains.     POHx:  Last eye exam: 10/31/2022    Prior eye surgery/laser/Trauma: Lateral orbital decompression, left 5/11/2022    CTL wearer (Brand soft CTL, RGP, Scleral etc.): no    Glasses (SV. Bifocal, Trifocal, PAL): cheaters    Family Hx of eye disease: None      PMHx:   Past Medical History:   Diagnosis Date     Graves disease      HTN (hypertension)        PSHx:   Past Surgical History:   Procedure Laterality Date     COLONOSCOPY  10-    adenoma polyps x 2     OPTICAL TRACKING SYSTEM ORBITOTOMY Left 5/11/2022    Procedure: Left orbital decompression and Sonopet Left Eye;  Surgeon: Marko Ledezma MD;  Location: [facility] OR     PROSTATE CANCER SURGERY       SONOPET EYE Left 5/11/2022 "    Procedure: SONOPET EYE;  Surgeon: Marko Ledezma MD;  Location: [facility] [place]       [facility] Taking:  Artificial Tears PRN    Allergies:  Lisinopril  Nifedipine    Assessment:  #Symblepharon, Left eye  - Symblepharon formation from temporal bulbar conj to lateral canthus, likely contributing to limitation in adduction and double vision in right gaze  - recommend symblepharolysis with conjunctival autograft +/- MMC OS  - discussed risk of surgery, including recurrence of symblepharon  - pt is on eliquis, recommend stopping 3 days prior to surgery  - 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      ALSO SEES PROVIDERS:  Dr. Gerardo Ledezma        Again, thank you for allowing me to participate in the care of your patient.      Sincerely,    El Purcell MD

## 2022-11-04 ENCOUNTER — TELEPHONE (OUTPATIENT)
Dept: OPHTHALMOLOGY | Facility: CLINIC | Age: 62
End: 2022-11-04

## 2022-11-04 PROBLEM — H53.2 DIPLOPIA: Status: ACTIVE | Noted: 2022-11-04

## 2022-11-04 PROBLEM — H11.232 SYMBLEPHARON OF LEFT EYE: Status: ACTIVE | Noted: 2022-11-04

## 2022-11-04 NOTE — TELEPHONE ENCOUNTER
Patient is scheduled for surgery with Dr. El Purcell     Spoke with: Abram     Date of Surgery: 11/15/22     Location: [facility]:  [address] [place] [id number]     H&P will be completed at: [facility] in [place]     COVID testing: [facility] in [place], PCR    Post Op scheduled on 11/16, 11/21, and 12/14     Surgery packet was sent to [facility]     Additional comments: Advised RN will call 1 - 3 business days prior with arrival time and instructions. Informed patient they will need an adult  and responsible adult to stay with for 24 hours following surgery

## 2022-11-07 ENCOUNTER — OFFICE VISIT (OUTPATIENT)
Dept: FAMILY MEDICINE | Facility: OTHER | Age: 62
End: 2022-11-07
Attending: STUDENT IN AN ORGANIZED HEALTH CARE EDUCATION/TRAINING PROGRAM
Payer: COMMERCIAL

## 2022-11-07 VITALS
TEMPERATURE: 97.2 F | HEART RATE: 57 BPM | WEIGHT: 271 LBS | HEIGHT: 73 IN | DIASTOLIC BLOOD PRESSURE: 78 MMHG | BODY MASS INDEX: 35.92 KG/M2 | OXYGEN SATURATION: 98 % | SYSTOLIC BLOOD PRESSURE: 130 MMHG

## 2022-11-07 DIAGNOSIS — I48.91 ATRIAL FIBRILLATION, UNSPECIFIED TYPE (H): ICD-10-CM

## 2022-11-07 DIAGNOSIS — I10 PRIMARY HYPERTENSION: ICD-10-CM

## 2022-11-07 DIAGNOSIS — H53.2 DIPLOPIA: ICD-10-CM

## 2022-11-07 DIAGNOSIS — E05.00 GRAVES DISEASE: ICD-10-CM

## 2022-11-07 DIAGNOSIS — K21.00 GASTROESOPHAGEAL REFLUX DISEASE WITH ESOPHAGITIS, UNSPECIFIED WHETHER HEMORRHAGE: ICD-10-CM

## 2022-11-07 DIAGNOSIS — I87.2 VENOUS STASIS DERMATITIS OF BOTH LOWER EXTREMITIES: ICD-10-CM

## 2022-11-07 DIAGNOSIS — Z01.818 PREOP GENERAL PHYSICAL EXAM: Primary | ICD-10-CM

## 2022-11-07 DIAGNOSIS — J31.0 CHRONIC RHINITIS: ICD-10-CM

## 2022-11-07 DIAGNOSIS — H11.239: ICD-10-CM

## 2022-11-07 PROBLEM — Z85.46 HISTORY OF PROSTATE CANCER: Chronic | Status: ACTIVE | Noted: 2022-05-03

## 2022-11-07 PROBLEM — Z85.46 HISTORY OF PROSTATE CANCER: Status: ACTIVE | Noted: 2022-05-03

## 2022-11-07 PROBLEM — C61 MALIGNANT NEOPLASM OF PROSTATE (H): Chronic | Status: ACTIVE | Noted: 2022-05-03

## 2022-11-07 LAB
ANION GAP SERPL CALCULATED.3IONS-SCNC: 11 MMOL/L (ref 7–15)
BASOPHILS # BLD AUTO: 0 10E3/UL (ref 0–0.2)
BASOPHILS NFR BLD AUTO: 1 %
BUN SERPL-MCNC: 24.2 MG/DL (ref 8–23)
CALCIUM SERPL-MCNC: 9.3 MG/DL (ref 8.8–10.2)
CHLORIDE SERPL-SCNC: 103 MMOL/L (ref 98–107)
CREAT SERPL-MCNC: 1.38 MG/DL (ref 0.67–1.17)
DEPRECATED HCO3 PLAS-SCNC: 23 MMOL/L (ref 22–29)
EOSINOPHIL # BLD AUTO: 0.2 10E3/UL (ref 0–0.7)
EOSINOPHIL NFR BLD AUTO: 3 %
ERYTHROCYTE [DISTWIDTH] IN BLOOD BY AUTOMATED COUNT: 12.7 % (ref 10–15)
GFR SERPL CREATININE-BSD FRML MDRD: 57 ML/MIN/1.73M2
GLUCOSE SERPL-MCNC: 124 MG/DL (ref 70–99)
HCT VFR BLD AUTO: 44.6 % (ref 40–53)
HGB BLD-MCNC: 15.9 G/DL (ref 13.3–17.7)
IMM GRANULOCYTES # BLD: 0 10E3/UL
IMM GRANULOCYTES NFR BLD: 1 %
LYMPHOCYTES # BLD AUTO: 1.3 10E3/UL (ref 0.8–5.3)
LYMPHOCYTES NFR BLD AUTO: 20 %
MCH RBC QN AUTO: 31.9 PG (ref 26.5–33)
MCHC RBC AUTO-ENTMCNC: 35.7 G/DL (ref 31.5–36.5)
MCV RBC AUTO: 90 FL (ref 78–100)
MONOCYTES # BLD AUTO: 0.7 10E3/UL (ref 0–1.3)
MONOCYTES NFR BLD AUTO: 10 %
NEUTROPHILS # BLD AUTO: 4.2 10E3/UL (ref 1.6–8.3)
NEUTROPHILS NFR BLD AUTO: 65 %
NRBC # BLD AUTO: 0 10E3/UL
NRBC BLD AUTO-RTO: 0 /100
PLATELET # BLD AUTO: 177 10E3/UL (ref 150–450)
POTASSIUM SERPL-SCNC: 4 MMOL/L (ref 3.4–5.3)
RBC # BLD AUTO: 4.98 10E6/UL (ref 4.4–5.9)
SODIUM SERPL-SCNC: 137 MMOL/L (ref 136–145)
TSH SERPL DL<=0.005 MIU/L-ACNC: 2.91 UIU/ML (ref 0.3–4.2)
WBC # BLD AUTO: 6.4 10E3/UL (ref 4–11)

## 2022-11-07 PROCEDURE — 36415 COLL VENOUS BLD VENIPUNCTURE: CPT | Performed by: STUDENT IN AN ORGANIZED HEALTH CARE EDUCATION/TRAINING PROGRAM

## 2022-11-07 PROCEDURE — 99214 OFFICE O/P EST MOD 30 MIN: CPT | Mod: 25 | Performed by: STUDENT IN AN ORGANIZED HEALTH CARE EDUCATION/TRAINING PROGRAM

## 2022-11-07 PROCEDURE — 93000 ELECTROCARDIOGRAM COMPLETE: CPT | Performed by: INTERNAL MEDICINE

## 2022-11-07 PROCEDURE — 80048 BASIC METABOLIC PNL TOTAL CA: CPT | Performed by: STUDENT IN AN ORGANIZED HEALTH CARE EDUCATION/TRAINING PROGRAM

## 2022-11-07 PROCEDURE — 84443 ASSAY THYROID STIM HORMONE: CPT | Performed by: STUDENT IN AN ORGANIZED HEALTH CARE EDUCATION/TRAINING PROGRAM

## 2022-11-07 PROCEDURE — 85025 COMPLETE CBC W/AUTO DIFF WBC: CPT | Performed by: STUDENT IN AN ORGANIZED HEALTH CARE EDUCATION/TRAINING PROGRAM

## 2022-11-07 NOTE — PROGRESS NOTES
[facility] [place]  [address]  [place] [address]  Phone: [phone]  Primary Provider: Rosmery Daly  Pre-op Performing Provider: CELY POLLOCK      PREOPERATIVE EVALUATION:  Today's date: 11/7/2022    Jude Zazueta is a 62 year old male who presents for a preoperative evaluation.    Surgical Information:  Surgery/Procedure: LEFT EYE EXCISION, LESION, CONJUNCTIVA; CONJUNCTIVAL AUTOGRAFT; AMNIOTIC MEMBRANE TRANSPLANT  Surgery Location: [facility]  Surgeon: Dr. Purcell  Surgery Date: 11/15/2022  Time of Surgery: unknown  Where patient plans to recover: At a TCU (Transitional Care Unit)  Fax number for surgical facility: Note does not need to be faxed, will be available electronically in Epic.    Type of Anesthesia Anticipated: general    Assessment & Plan     The proposed surgical procedure is considered INTERMEDIATE risk.    Preop general physical exam  Diplopia  Symblepharon, unspecified laterality  Labs today pending. EKG was completed, reassuring. Covid-19 test planned next week. Patient is optimized for surgery. Discussed pre-perative guidelines and medication management, see AVS.   - Basic metabolic panel; Future  - CBC with Platelets & Differential; Future  - EKG 12-lead complete w/read - Clinics  - CBC with Platelets & Differential  - Basic metabolic panel    Primary hypertension  Excellent control today.  We will continue losartan and metoprolol perioperatively.  Will hold spironolactone and furosemide day of surgery. After visit, creatinine returned mildly elevated from baseline. Will monitor hydration status and avoid NSAIDs. Repeat in 3 days.     Atrial fibrillation, unspecified type (H)  Rate controlled.  Continues on metoprolol tartrate twice daily.  On anticoagulation with Eliquis.  Will hold Eliquis 3 days preoperatively.    Gastroesophageal reflux disease with esophagitis, unspecified whether hemorrhage  Stable, only occasional use of omeprazole.    Graves disease  [facility]  Reports he is up-to-date on monitoring.  Managing medically, on methimazole, with no surgery planned.  Does have significant thyromegaly.  No symptoms of dysphagia or odynophagia.  - TSH with free T4 reflex; Future  - TSH with free T4 reflex    Chronic rhinitis  Stable, occasional use of mometasone or Flonase.      Venous stasis dermatitis of both lower extremities  Chronic skin changes of lower extremities.  No edema    Risks and Recommendations:  The patient has the following additional risks and recommendations for perioperative complications:   - No identified additional risk factors other than previously addressed   - noted large thyromegaly on exam secondary to Graves  - MIRIAM on CPAP       RECOMMENDATION:  APPROVAL GIVEN to proceed with proposed procedure, without further diagnostic evaluation. Will repeat BMP 11/10/22 prior to surgery to ensure improved creatinine from today.       I spent a total of 37 minutes on the day of the visit.   Time spent doing chart review, history and exam, documentation and further activities per the note        Subjective   HPI related to upcoming procedure: After his Graves' diagnosis, underwent left lateral wall decompression by his left eye, however he has now developed scar tissue that when he looks to the right he develops double vision.  Planning to remove the scar tissue and replace with skin from his eyelid.    Has been feeling well with no fevers, chills, or infection.    No chest pain or dyspnea.  Can tolerate more than 4 METS of activity.      Preop Questions 11/7/2022   1. Have you ever had a heart attack or stroke? No   2. Have you ever had surgery on your heart or blood vessels, such as a stent placement, a coronary artery bypass, or surgery on an artery in your head, neck, heart, or legs? No   3. Do you have chest pain with activity? No   4. Do you have a history of  heart failure? No   5. Do you currently have a cold,  bronchitis or symptoms of other infection? No   6. Do you have a cough, shortness of breath, or wheezing? No   7. Do you or anyone in your family have previous history of blood clots? No   8. Do you or does anyone in your family have a serious bleeding problem such as prolonged bleeding following surgeries or cuts? No   9. Have you ever had problems with anemia or been told to take iron pills? No   10. Have you had any abnormal blood loss such as black, tarry or bloody stools? No   11. Have you ever had a blood transfusion? No   12. Are you willing to have a blood transfusion if it is medically needed before, during, or after your surgery? Yes   13. Have you or any of your relatives ever had problems with anesthesia? No   14. Do you have sleep apnea, excessive snoring or daytime drowsiness? YES - sleep apnea on CPAP    14a. Do you have a CPAP machine? Yes   15. Do you have any artifical heart valves or other implanted medical devices like a pacemaker, defibrillator, or continuous glucose monitor? No   16. Do you have artificial joints? No   17. Are you allergic to latex? No     Health Care Directive:  Patient does not have a Health Care Directive or Living Will: Discussed advance care planning with patient; however, patient declined at this time.    Preoperative Review of :   reviewed - no record of controlled substances prescribed.    Status of Chronic Conditions:  A-FIB - Patient has a longstanding history of chronic A-fib currently on rate control. Current treatment regimen includes Eliquis for stroke prevention and denies significant symptoms of lightheadedness, palpitations or dyspnea.     HYPERTENSION - Patient has longstanding history of HTN , currently denies any symptoms referable to elevated blood pressure. Specifically denies chest pain, palpitations, dyspnea, orthopnea, PND or peripheral edema. Blood pressure readings have been in normal range. Current medication regimen is as listed below. Patient  denies any side effects of medication.     Graves disease - [facility] 6 months. On methimazole.     Chronic rhinitis - not using flonase and nasonex on a daily basis. Has allergic rhinitis related to pollen.     GERD - uses omeprazole as needed.     Review of Systems  CONSTITUTIONAL: NEGATIVE for fever, chills, change in weight  INTEGUMENTARY/SKIN: NEGATIVE for worrisome rashes, moles or lesions  EYES: NEGATIVE for vision changes or irritation  ENT/MOUTH: NEGATIVE for ear, mouth and throat problems  RESP: NEGATIVE for significant cough or SOB  CV: NEGATIVE for chest pain, palpitations or peripheral edema  GI: NEGATIVE for nausea, abdominal pain, heartburn, or change in bowel habits  : NEGATIVE for frequency, dysuria, or hematuria  MUSCULOSKELETAL: NEGATIVE for significant arthralgias or myalgia  NEURO: NEGATIVE for weakness, dizziness or paresthesias  ENDOCRINE: NEGATIVE for temperature intolerance, skin/hair changes  HEME: NEGATIVE for bleeding problems  PSYCHIATRIC: NEGATIVE for changes in mood or affect    Patient Active Problem List    Diagnosis Date Noted     Atrial fibrillation, unspecified type (H) 02/09/2022     Priority: High     Symblepharon of left eye 11/04/2022     Priority: Medium     Added automatically from request for surgery [id number]       Diplopia 11/04/2022     Priority: Medium     Added automatically from request for surgery [id number]       History of prostate cancer - 2009 s/p brachytherapy 05/03/2022     Priority: Medium     Gastroesophageal reflux disease with esophagitis, unspecified whether hemorrhage 10/08/2021     Priority: Medium     Graves disease      Priority: Medium     HTN (hypertension)      Priority: Medium     Muscle weakness 11/30/2017     Priority: Medium     Chronic pain of right knee 11/30/2017     Priority: Medium     Sinusitis, chronic 04/12/2013     Priority: Medium     Problem list name updated by automated process. Provider to review       Nasal  obstruction 04/12/2013     Priority: Medium     Chronic rhinitis 04/12/2013     Priority: Medium     Allergic rhinitis on IT       Hypertrophy of nasal turbinates 04/12/2013     Priority: Medium     Deviated nasal septum 04/12/2013     Priority: Medium     Advanced care planning/counseling discussion 07/18/2012     Priority: Low      Past Medical History:   Diagnosis Date     Graves disease      HTN (hypertension)      Past Surgical History:   Procedure Laterality Date     COLONOSCOPY  10-    adenoma polyps x 2     OPTICAL TRACKING SYSTEM ORBITOTOMY Left 5/11/2022    Procedure: Left orbital decompression and Sonopet Left Eye;  Surgeon: Marko Ledezma MD;  [place] [facility] [place]     PROSTATE CANCER SURGERY       SONOPET EYE Left 5/11/2022    Procedure: SONOPET EYE;  Surgeon: Marko Ledezma MD;  [place] [facility] [place]     Current Outpatient Medications   Medication Sig Dispense Refill     atorvastatin (LIPITOR) 40 MG tablet 40 mg daily        ELIQUIS ANTICOAGULANT 5 MG tablet 5 mg 2 times daily        erythromycin (ROMYCIN) 5 MG/GM ophthalmic ointment Apply a small amount of ointment to the incision sites three times per day and then into the lower eyelid at bedtime until the tubes run out. 7 g 0     fluticasone (FLONASE) 50 MCG/ACT nasal spray        furosemide (LASIX) 40 MG tablet Take 40 mg by mouth daily        losartan (COZAAR) 100 MG tablet Take 100 mg by mouth daily       methimazole (TAPAZOLE) 5 MG tablet Take 3 tablets by mouth daily       metoprolol tartrate (LOPRESSOR) 50 MG tablet Take 1.5 tablets by mouth twice daily       mometasone (NASONEX) 50 MCG/ACT nasal spray Spray 2 sprays into both nostrils daily       neomycin-polymixin-dexamethasone (MAXITROL) 0.1 % ophthalmic suspension Apply 1 drop to eye 4 times daily Instill into operative eye(s) per physician instructions. 5 mL 0     omeprazole (PRILOSEC) 20 MG DR capsule Take 20 mg by mouth daily Takes as needed       spironolactone (ALDACTONE) 50 MG  "tablet Take 1 tablet (50 mg) by mouth daily 90 tablet 1     tamsulosin (FLOMAX) 0.4 MG 24 hr capsule Take 0.4 mg by mouth daily Takes at bedtime         Allergies   Allergen Reactions     Lisinopril Cough     Nifedipine Swelling     Ankle swelling     Pollen Extract         Social History     Tobacco Use     Smoking status: Former     Packs/day: 1.00     Years: 30.00     Pack years: 30.00     Types: Cigarettes     Smokeless tobacco: Never     Tobacco comments:     Quit in 2005; no passive smoke exposure   Substance Use Topics     Alcohol use: Yes     Comment: Occasionally     Family History   Problem Relation Age of Onset     Diabetes Sister      Thyroid Disease Brother      Prostate Cancer Brother      Thyroid Disease Mother      Prostate Cancer Father      Strabismus No family hx of      History   Drug Use No         Objective     /78 (BP Location: Left arm, Patient Position: Chair, Cuff Size: Adult Large)   Pulse 57   Temp 97.2  F (36.2  C) (Tympanic)   Ht 1.854 m (6' 1\")   Wt 122.9 kg (271 lb)   SpO2 98%   BMI 35.75 kg/m      Physical Exam    GENERAL APPEARANCE: healthy, alert and no distress     EYES: EOMI,  PERRL     HENT: ear canals and TM's mildly retracted bilaterally with no effusion; and nose and mouth without ulcers or lesions; MP 3     NECK: no adenopathy, no asymmetry, no adenopathy or scars and thyroid very enlarged, nontender to palpation      RESP: lungs clear to auscultation - no rales, rhonchi or wheezes     CV: regular rates and rhythm, normal S1 S2, no S3 or S4 and no murmur, click or rub     ABDOMEN:  soft, nontender, no HSM or masses and bowel sounds normal     MS: extremities normal- no gross deformities noted, no evidence of inflammation in joints, FROM in all extremities.     SKIN: no suspicious lesions or rashes; venous dermatitis over LE bilaterally w/o edema      NEURO: Normal strength and tone, sensory exam grossly normal, mentation intact and speech normal     PSYCH: " mentation appears normal. and affect normal/bright     LYMPHATICS: No cervical adenopathy    Recent Labs   Lab Test 05/03/22  1335 11/09/21  1347   HGB 16.0  --      --     140   POTASSIUM 3.9 3.6   CR 1.20 1.24        Diagnostics:  Recent Results (from the past 24 hour(s))   TSH with free T4 reflex    Collection Time: 11/07/22 11:28 AM   Result Value Ref Range    TSH 2.91 0.30 - 4.20 uIU/mL   Basic metabolic panel    Collection Time: 11/07/22 11:28 AM   Result Value Ref Range    Sodium 137 136 - 145 mmol/L    Potassium 4.0 3.4 - 5.3 mmol/L    Chloride 103 98 - 107 mmol/L    Carbon Dioxide (CO2) 23 22 - 29 mmol/L    Anion Gap 11 7 - 15 mmol/L    Urea Nitrogen 24.2 (H) 8.0 - 23.0 mg/dL    Creatinine 1.38 (H) 0.67 - 1.17 mg/dL    Calcium 9.3 8.8 - 10.2 mg/dL    Glucose 124 (H) 70 - 99 mg/dL    GFR Estimate 57 (L) >60 mL/min/1.73m2   CBC with platelets and differential    Collection Time: 11/07/22 11:28 AM   Result Value Ref Range    WBC Count 6.4 4.0 - 11.0 10e3/uL    RBC Count 4.98 4.40 - 5.90 10e6/uL    Hemoglobin 15.9 13.3 - 17.7 g/dL    Hematocrit 44.6 40.0 - 53.0 %    MCV 90 78 - 100 fL    MCH 31.9 26.5 - 33.0 pg    MCHC 35.7 31.5 - 36.5 g/dL    RDW 12.7 10.0 - 15.0 %    Platelet Count 177 150 - 450 10e3/uL    % Neutrophils 65 %    % Lymphocytes 20 %    % Monocytes 10 %    % Eosinophils 3 %    % Basophils 1 %    % Immature Granulocytes 1 %    NRBCs per 100 WBC 0 <1 /100    Absolute Neutrophils 4.2 1.6 - 8.3 10e3/uL    Absolute Lymphocytes 1.3 0.8 - 5.3 10e3/uL    Absolute Monocytes 0.7 0.0 - 1.3 10e3/uL    Absolute Eosinophils 0.2 0.0 - 0.7 10e3/uL    Absolute Basophils 0.0 0.0 - 0.2 10e3/uL    Absolute Immature Granulocytes 0.0 <=0.4 10e3/uL    Absolute NRBCs 0.0 10e3/uL      EKG: atrial fibrillation, rate controlled, normal axis, normal intervals, no acute ST/T changes c/w ischemia, no LVH by voltage criteria, unchanged from previous tracings    Revised Cardiac Risk Index (RCRI):  The patient has  the following serious cardiovascular risks for perioperative complications:   - No serious cardiac risks = 0 points     RCRI Interpretation: 0 points: Class I (very low risk - 0.4% complication rate)         Signed Electronically by: Marcia Cruz MD  Copy of this evaluation report is provided to requesting physician.

## 2022-11-07 NOTE — PATIENT INSTRUCTIONS
Before Your Surgery     Call your surgeon if there is any change in your health. This includes signs of a cold or flu (such as a sore throat, runny nose, cough, rash, fever, urinary symptoms).   If you take prescribed drugs: Follow your doctor s orders about which medicines to take and which to stop until after surgery.  HOLD: eliquis three days prior to surgery  HOLD: lasix, spironolactone morning of surgery   Okay to continue atorvastain, metoprolol, tamsulosin, omeprazole, methimazole, nasonex  Do not smoke, drink alcohol or take over the counter medicine (unless your surgeon or primary care doctor tells you to) for the 24 hours before and after surgery. Smoking can increase your risk of an infection. Nicotine patches are safe to use. NSAIDS like ibuprofen (Advil, Motrin) should be held one day before surgery. Celebrex should be held 3 days before surgery. Naproxen (Aleve) should be held four days before surgery.   Do not take supplements/vitamins day prior and morning of surgery. Some supplements/vitamins can interfere with anesthesia.   Eating and drinking prior to surgery: follow the instructions from your surgeon  Take a shower or bath the night before surgery and morning of surgery. Use the soap your surgeon gave you to gently clean your skin. If you do not have soap from your surgeon, use your regular soap. Do not shave or scrub the surgery site.  Wear clean pajamas and have clean sheets on your bed.

## 2022-11-07 NOTE — RESULT ENCOUNTER NOTE
Please call and notify patient of his results - want to make sure he gets them verbally, as he might not see it on [facility] - see message for information and please schedule a lab only appointment for thursday.     Thanks!

## 2022-11-07 NOTE — NURSING NOTE
"Chief Complaint   Patient presents with     Pre-Op Exam       Initial /78 (BP Location: Left arm, Patient Position: Chair, Cuff Size: Adult Large)   Pulse 57   Temp 97.2  F (36.2  C) (Tympanic)   Ht 1.854 m (6' 1\")   Wt 122.9 kg (271 lb)   SpO2 98%   BMI 35.75 kg/m   Estimated body mass index is 35.75 kg/m  as calculated from the following:    Height as of this encounter: 1.854 m (6' 1\").    Weight as of this encounter: 122.9 kg (271 lb).  Medication Reconciliation: complete  ROBERT LEONARD LPN    "

## 2022-11-08 ENCOUNTER — TELEPHONE (OUTPATIENT)
Dept: FAMILY MEDICINE | Facility: OTHER | Age: 62
End: 2022-11-08

## 2022-11-08 NOTE — TELEPHONE ENCOUNTER
Called and reviewed results - will increase water intake. Hasn't been drinking much. Hold lasix one day.   Repeat lab Thursday.     Marcia Cruz MD

## 2022-11-09 DIAGNOSIS — H11.232 SYMBLEPHARON OF LEFT EYE: Primary | ICD-10-CM

## 2022-11-09 RX ORDER — OFLOXACIN 3 MG/ML
1 SOLUTION/ DROPS OPHTHALMIC 4 TIMES DAILY
Qty: 5 ML | Refills: 0 | Status: SHIPPED | OUTPATIENT
Start: 2022-11-09 | End: 2022-11-21

## 2022-11-09 RX ORDER — PREDNISOLONE ACETATE 10 MG/ML
1 SUSPENSION/ DROPS OPHTHALMIC 4 TIMES DAILY
Qty: 5 ML | Refills: 1 | Status: SHIPPED | OUTPATIENT
Start: 2022-11-09 | End: 2022-11-21

## 2022-11-10 ENCOUNTER — LAB (OUTPATIENT)
Dept: LAB | Facility: OTHER | Age: 62
End: 2022-11-10
Payer: COMMERCIAL

## 2022-11-10 DIAGNOSIS — I10 PRIMARY HYPERTENSION: ICD-10-CM

## 2022-11-10 LAB
ANION GAP SERPL CALCULATED.3IONS-SCNC: 10 MMOL/L (ref 7–15)
BUN SERPL-MCNC: 19.1 MG/DL (ref 8–23)
CALCIUM SERPL-MCNC: 9 MG/DL (ref 8.8–10.2)
CHLORIDE SERPL-SCNC: 106 MMOL/L (ref 98–107)
CREAT SERPL-MCNC: 1.38 MG/DL (ref 0.67–1.17)
DEPRECATED HCO3 PLAS-SCNC: 24 MMOL/L (ref 22–29)
GFR SERPL CREATININE-BSD FRML MDRD: 57 ML/MIN/1.73M2
GLUCOSE SERPL-MCNC: 102 MG/DL (ref 70–99)
POTASSIUM SERPL-SCNC: 4.4 MMOL/L (ref 3.4–5.3)
SODIUM SERPL-SCNC: 140 MMOL/L (ref 136–145)

## 2022-11-10 PROCEDURE — 80048 BASIC METABOLIC PNL TOTAL CA: CPT

## 2022-11-10 PROCEDURE — 36415 COLL VENOUS BLD VENIPUNCTURE: CPT

## 2022-11-14 ENCOUNTER — ALLIED HEALTH/NURSE VISIT (OUTPATIENT)
Dept: FAMILY MEDICINE | Facility: OTHER | Age: 62
End: 2022-11-14
Attending: OPHTHALMOLOGY
Payer: COMMERCIAL

## 2022-11-14 ENCOUNTER — ANESTHESIA EVENT (OUTPATIENT)
Dept: SURGERY | Facility: AMBULATORY SURGERY CENTER | Age: 62
End: 2022-11-14
Payer: COMMERCIAL

## 2022-11-14 DIAGNOSIS — Z20.822 COVID-19 RULED OUT: Primary | ICD-10-CM

## 2022-11-14 LAB — SARS-COV-2 RNA RESP QL NAA+PROBE: NEGATIVE

## 2022-11-14 PROCEDURE — U0003 INFECTIOUS AGENT DETECTION BY NUCLEIC ACID (DNA OR RNA); SEVERE ACUTE RESPIRATORY SYNDROME CORONAVIRUS 2 (SARS-COV-2) (CORONAVIRUS DISEASE [COVID-19]), AMPLIFIED PROBE TECHNIQUE, MAKING USE OF HIGH THROUGHPUT TECHNOLOGIES AS DESCRIBED BY CMS-2020-01-R: HCPCS

## 2022-11-14 PROCEDURE — U0005 INFEC AGEN DETEC AMPLI PROBE: HCPCS

## 2022-11-14 RX ORDER — MITOMYCIN IN WATER/PF 0.2 MG/ML
0.2 SYRINGE (ML) OPHTHALMIC (EYE) ONCE
Status: DISCONTINUED | OUTPATIENT
Start: 2022-11-14 | End: 2022-11-16 | Stop reason: HOSPADM

## 2022-11-15 ENCOUNTER — ANESTHESIA (OUTPATIENT)
Dept: SURGERY | Facility: AMBULATORY SURGERY CENTER | Age: 62
End: 2022-11-15
Payer: COMMERCIAL

## 2022-11-15 ENCOUNTER — HOSPITAL ENCOUNTER (OUTPATIENT)
Facility: AMBULATORY SURGERY CENTER | Age: 62
Discharge: HOME | End: 2022-11-15
Attending: OPHTHALMOLOGY
Payer: COMMERCIAL

## 2022-11-15 VITALS
BODY MASS INDEX: 35.78 KG/M2 | DIASTOLIC BLOOD PRESSURE: 90 MMHG | HEIGHT: 73 IN | TEMPERATURE: 96.9 F | OXYGEN SATURATION: 98 % | SYSTOLIC BLOOD PRESSURE: 130 MMHG | WEIGHT: 270 LBS | RESPIRATION RATE: 18 BRPM | HEART RATE: 88 BPM

## 2022-11-15 DIAGNOSIS — H53.2 DIPLOPIA: ICD-10-CM

## 2022-11-15 DIAGNOSIS — H11.232 SYMBLEPHARON OF LEFT EYE: ICD-10-CM

## 2022-11-15 PROCEDURE — 68335 REVISE/GRAFT EYELID LINING: CPT | Mod: LT | Performed by: OPHTHALMOLOGY

## 2022-11-15 PROCEDURE — 65782 OCULAR RECONST TRANSPLANT: CPT | Mod: LT

## 2022-11-15 PROCEDURE — 66999 UNLISTED PX ANT SEGMENT EYE: CPT | Mod: LT | Performed by: OPHTHALMOLOGY

## 2022-11-15 PROCEDURE — 68335 REVISE/GRAFT EYELID LINING: CPT | Mod: LT

## 2022-11-15 PROCEDURE — 65782 OCULAR RECONST TRANSPLANT: CPT | Mod: LT | Performed by: OPHTHALMOLOGY

## 2022-11-15 DEVICE — IMPLANTABLE DEVICE: Type: IMPLANTABLE DEVICE | Site: EYE | Status: FUNCTIONAL

## 2022-11-15 RX ORDER — ACETAMINOPHEN 325 MG/1
975 TABLET ORAL ONCE
Status: COMPLETED | OUTPATIENT
Start: 2022-11-15 | End: 2022-11-15

## 2022-11-15 RX ORDER — TETRACAINE HYDROCHLORIDE 5 MG/ML
SOLUTION OPHTHALMIC PRN
Status: DISCONTINUED | OUTPATIENT
Start: 2022-11-15 | End: 2022-11-15 | Stop reason: HOSPADM

## 2022-11-15 RX ORDER — FENTANYL CITRATE 50 UG/ML
INJECTION, SOLUTION INTRAMUSCULAR; INTRAVENOUS PRN
Status: DISCONTINUED | OUTPATIENT
Start: 2022-11-15 | End: 2022-11-15

## 2022-11-15 RX ORDER — LIDOCAINE HYDROCHLORIDE AND EPINEPHRINE 10; 10 MG/ML; UG/ML
INJECTION, SOLUTION INFILTRATION; PERINEURAL PRN
Status: DISCONTINUED | OUTPATIENT
Start: 2022-11-15 | End: 2022-11-15 | Stop reason: HOSPADM

## 2022-11-15 RX ORDER — FENTANYL CITRATE 50 UG/ML
25 INJECTION, SOLUTION INTRAMUSCULAR; INTRAVENOUS EVERY 5 MIN PRN
Status: DISCONTINUED | OUTPATIENT
Start: 2022-11-15 | End: 2022-11-16 | Stop reason: HOSPADM

## 2022-11-15 RX ORDER — BALANCED SALT SOLUTION 6.4; .75; .48; .3; 3.9; 1.7 MG/ML; MG/ML; MG/ML; MG/ML; MG/ML; MG/ML
SOLUTION OPHTHALMIC PRN
Status: DISCONTINUED | OUTPATIENT
Start: 2022-11-15 | End: 2022-11-15 | Stop reason: HOSPADM

## 2022-11-15 RX ORDER — ONDANSETRON 2 MG/ML
4 INJECTION INTRAMUSCULAR; INTRAVENOUS EVERY 30 MIN PRN
Status: DISCONTINUED | OUTPATIENT
Start: 2022-11-15 | End: 2022-11-16 | Stop reason: HOSPADM

## 2022-11-15 RX ORDER — DEXAMETHASONE SODIUM PHOSPHATE 4 MG/ML
INJECTION, SOLUTION INTRA-ARTICULAR; INTRALESIONAL; INTRAMUSCULAR; INTRAVENOUS; SOFT TISSUE PRN
Status: DISCONTINUED | OUTPATIENT
Start: 2022-11-15 | End: 2022-11-15 | Stop reason: HOSPADM

## 2022-11-15 RX ORDER — LIDOCAINE HYDROCHLORIDE 20 MG/ML
INJECTION, SOLUTION INFILTRATION; PERINEURAL PRN
Status: DISCONTINUED | OUTPATIENT
Start: 2022-11-15 | End: 2022-11-15

## 2022-11-15 RX ORDER — OXYCODONE HYDROCHLORIDE 5 MG/1
5 TABLET ORAL EVERY 4 HOURS PRN
Status: DISCONTINUED | OUTPATIENT
Start: 2022-11-15 | End: 2022-11-16 | Stop reason: HOSPADM

## 2022-11-15 RX ORDER — PROPOFOL 10 MG/ML
INJECTION, EMULSION INTRAVENOUS CONTINUOUS PRN
Status: DISCONTINUED | OUTPATIENT
Start: 2022-11-15 | End: 2022-11-15

## 2022-11-15 RX ORDER — ONDANSETRON 4 MG/1
4 TABLET, ORALLY DISINTEGRATING ORAL EVERY 30 MIN PRN
Status: DISCONTINUED | OUTPATIENT
Start: 2022-11-15 | End: 2022-11-16 | Stop reason: HOSPADM

## 2022-11-15 RX ORDER — PREDNISOLONE ACETATE 10 MG/ML
SUSPENSION/ DROPS OPHTHALMIC PRN
Status: DISCONTINUED | OUTPATIENT
Start: 2022-11-15 | End: 2022-11-15 | Stop reason: HOSPADM

## 2022-11-15 RX ORDER — PROPOFOL 10 MG/ML
INJECTION, EMULSION INTRAVENOUS PRN
Status: DISCONTINUED | OUTPATIENT
Start: 2022-11-15 | End: 2022-11-15

## 2022-11-15 RX ORDER — SODIUM CHLORIDE, SODIUM LACTATE, POTASSIUM CHLORIDE, CALCIUM CHLORIDE 600; 310; 30; 20 MG/100ML; MG/100ML; MG/100ML; MG/100ML
INJECTION, SOLUTION INTRAVENOUS CONTINUOUS
Status: DISCONTINUED | OUTPATIENT
Start: 2022-11-15 | End: 2022-11-16 | Stop reason: HOSPADM

## 2022-11-15 RX ORDER — EPINEPHRINE 1 MG/ML(1)
AMPUL (ML) INJECTION PRN
Status: DISCONTINUED | OUTPATIENT
Start: 2022-11-15 | End: 2022-11-15 | Stop reason: HOSPADM

## 2022-11-15 RX ORDER — HYDROMORPHONE HYDROCHLORIDE 1 MG/ML
0.2 INJECTION, SOLUTION INTRAMUSCULAR; INTRAVENOUS; SUBCUTANEOUS EVERY 5 MIN PRN
Status: DISCONTINUED | OUTPATIENT
Start: 2022-11-15 | End: 2022-11-16 | Stop reason: HOSPADM

## 2022-11-15 RX ORDER — LIDOCAINE 40 MG/G
CREAM TOPICAL
Status: DISCONTINUED | OUTPATIENT
Start: 2022-11-15 | End: 2022-11-16 | Stop reason: HOSPADM

## 2022-11-15 RX ORDER — MEPERIDINE HYDROCHLORIDE 25 MG/ML
12.5 INJECTION INTRAMUSCULAR; INTRAVENOUS; SUBCUTANEOUS
Status: DISCONTINUED | OUTPATIENT
Start: 2022-11-15 | End: 2022-11-16 | Stop reason: HOSPADM

## 2022-11-15 RX ORDER — FENTANYL CITRATE 50 UG/ML
25 INJECTION, SOLUTION INTRAMUSCULAR; INTRAVENOUS
Status: DISCONTINUED | OUTPATIENT
Start: 2022-11-15 | End: 2022-11-16 | Stop reason: HOSPADM

## 2022-11-15 RX ADMIN — PROPOFOL 100 MCG/KG/MIN: 10 INJECTION, EMULSION INTRAVENOUS at 12:48

## 2022-11-15 RX ADMIN — ACETAMINOPHEN 975 MG: 325 TABLET ORAL at 10:59

## 2022-11-15 RX ADMIN — PROPOFOL 50 MG: 10 INJECTION, EMULSION INTRAVENOUS at 12:42

## 2022-11-15 RX ADMIN — FENTANYL CITRATE 50 MCG: 50 INJECTION, SOLUTION INTRAMUSCULAR; INTRAVENOUS at 12:43

## 2022-11-15 RX ADMIN — FENTANYL CITRATE 50 MCG: 50 INJECTION, SOLUTION INTRAMUSCULAR; INTRAVENOUS at 12:36

## 2022-11-15 RX ADMIN — PROPOFOL 50 MG: 10 INJECTION, EMULSION INTRAVENOUS at 12:43

## 2022-11-15 RX ADMIN — LIDOCAINE HYDROCHLORIDE 60 MG: 20 INJECTION, SOLUTION INFILTRATION; PERINEURAL at 12:43

## 2022-11-15 NOTE — ANESTHESIA CARE TRANSFER NOTE
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    Anesthesia Type:   General     Note:    Oropharynx: oropharynx clear of all foreign objects and spontaneously breathing  Level of Consciousness: awake  Oxygen Supplementation: room air    Independent Airway: airway patency satisfactory and stable  Dentition: dentition unchanged  Vital Signs Stable: post-procedure vital signs reviewed and stable  Report to RN Given: handoff report given  Patient transferred to: Phase II    Handoff Report: Identifed the Patient, Identified the Reponsible Provider, Reviewed the pertinent medical history, Discussed the surgical course, Reviewed Intra-OP anesthesia mangement and issues during anesthesia, Set expectations for post-procedure period and Allowed opportunity for questions and acknowledgement of understanding      Vitals:  Vitals Value Taken Time   BP     Temp     Pulse     Resp     SpO2         Electronically Signed By: MARIO Dinero CRNA  November 15, 2022  1:55 PM

## 2022-11-15 NOTE — ANESTHESIA PREPROCEDURE EVALUATION
Anesthesia Pre-Procedure Evaluation    Patient: Jude Zazueta   MRN: [id number] : 1960        Procedure : Procedure(s):  LEFT EYE EXCISION, LESION, CONJUNCTIVA; CONJUNCTIVAL AUTOGRAFT; AMNIOTIC MEMBRANE TRANSPLANT; MITOMYCIN C (0.02%) - LEFT EYE          Past Medical History:   Diagnosis Date     Graves disease      HTN (hypertension)       Past Surgical History:   Procedure Laterality Date     COLONOSCOPY  10-    adenoma polyps x 2     OPTICAL TRACKING SYSTEM ORBITOTOMY Left 2022    Procedure: Left orbital decompression and Sonopet Left Eye;  Surgeon: Marko Ledezma MD;  Location: [facility] [place] OR     PROSTATE CANCER SURGERY       SONOPET EYE Left 2022    Procedure: SONOPET EYE;  Surgeon: Marko Ledezma MD;  Location: [facility] [place] OR      Allergies   Allergen Reactions     Lisinopril Cough     Nifedipine Swelling     Ankle swelling     Pollen Extract       Social History     Tobacco Use     Smoking status: Former     Packs/day: 1.00     Years: 30.00     Pack years: 30.00     Types: Cigarettes     Smokeless tobacco: Never     Tobacco comments:     Quit in ; no passive smoke exposure   Substance Use Topics     Alcohol use: Yes     Comment: Occasionally      Wt Readings from Last 1 Encounters:   22 122.9 kg (271 lb)        Anesthesia Evaluation            ROS/MED HX  ENT/Pulmonary:     (+) sleep apnea,     Neurologic:       Cardiovascular:     (+) hypertension-----    METS/Exercise Tolerance:     Hematologic:       Musculoskeletal:       GI/Hepatic:       Renal/Genitourinary:       Endo:     (+) thyroid problem,  hyperthyroidism,     Psychiatric/Substance Use:       Infectious Disease:       Malignancy:       Other:               OUTSIDE LABS:  CBC:   Lab Results   Component Value Date    WBC 6.4 2022    WBC 5.8 2022    HGB 15.9 2022    HGB 16.0 2022    HCT 44.6 2022    HCT 46.6 2022     2022     2022     BMP:   Lab Results    Component Value Date     11/10/2022     11/07/2022    POTASSIUM 4.4 11/10/2022    POTASSIUM 4.0 11/07/2022    CHLORIDE 106 11/10/2022    CHLORIDE 103 11/07/2022    CO2 24 11/10/2022    CO2 23 11/07/2022    BUN 19.1 11/10/2022    BUN 24.2 (H) 11/07/2022    CR 1.38 (H) 11/10/2022    CR 1.38 (H) 11/07/2022     (H) 11/10/2022     (H) 11/07/2022     COAGS: No results found for: PTT, INR, FIBR  POC: No results found for: BGM, HCG, HCGS  HEPATIC: No results found for: ALBUMIN, PROTTOTAL, ALT, AST, GGT, ALKPHOS, BILITOTAL, BILIDIRECT, JEWEL  OTHER:   Lab Results   Component Value Date    ADRY 9.0 11/10/2022    TSH 2.91 11/07/2022    T4 0.66 (L) 05/03/2022       Anesthesia Plan    ASA Status:  2      Anesthesia Type: MAC.     - Reason for MAC: immobility needed, straight local not clinically adequate              Consents    Anesthesia Plan(s) and associated risks, benefits, and realistic alternatives discussed. Questions answered and patient/representative(s) expressed understanding.     - Discussed: Risks, Benefits and Alternatives for BOTH SEDATION and the PROCEDURE were discussed     - Discussed with:  Patient      - Extended Intubation/Ventilatory Support Discussed: No.      - Patient is DNR/DNI Status: No    Use of blood products discussed: No .     Postoperative Care       PONV prophylaxis: Ondansetron (or other 5HT-3), Dexamethasone or Solumedrol     Comments:                Titus Zazueta MD, MD

## 2022-11-15 NOTE — OP NOTE
Operative Report    Date of Operation: November 15, 2022  Pre-operative diagnosis:   1. Symblepharon, LEFT eye  2. Restrictive binocular diplopia    Post-operative diagnosis: Same     Procedure(s):   1. Symblepharolysis, LEFT EYE with Mitomycin C 0.02%  2. Conjunctival autograft, LEFT eye  3. Amniotic membrane transplantation (10 mm x 8 mm), LEFT eye    Surgeon(s): Dr. El Purcell  Fellow: Олег Veliz MD    Findings: None   Blood Loss: None  Complications: None     Implant Name Type Inv. Item Serial No.  Lot No. LRB No. Used Action   BioTissue AmnioGraft Amniotic Graft 1.5 cm x 2 cm Lens/Eye Implant  [id number] BIO-TISSUE  Left 1 Implanted       INDICATION FOR PROCEDURE   The patient has been followed in our eye clinic with a symblepharon of the left eye and resultant right-gaze diplopia. The risks, including, but not limited to infection, loss of vision, loss of eye, need for more surgery, and bleeding, along with the benefits, alternatives, expectations, and the procedure itself were discussed at length with the patient who wished to proceed with surgery.   DESCRIPTION OF PROCEDURE   In the preoperative suite, the patient was identified, the surgical site marked and informed consent was obtained. The patient was the brought back to the operative suite where the appropriate anesthesia monitors were connected. A routine time-out was performed and a retrobulbar block using a 50:50 mixture of 2% lidocaine and 0.75% bupavicaine was administered to the right eye. The patient's operative eye was then prepped and draped in the usual sterile fashion for ophthalmic surgery.  Following draping, a lid speculum was placed to the operative eye. The eye was inspected and a tense fibrous band was noted to extend from just posterior to the lateral limbus to the lateral canthus. A 6-0 silk traction suture was placed about 1mm posterior to the temporal limbus to allow for better visualization. Subconjunctival  injection of lidocaine 1% with 1:100,000 epinephrine was administered in the area of the symblepharon temporally. Using 0.12 forceps and sharp Connie scissors, the conjunctiva was incised and the fibrotic material was excised along with surrounding Tenon's membrane. With release of the symblepharon, the eye was noted to be more easily adducted. A Weck roosevelt soaked with 1:100,000 epinephrine was placed in the area of Tenon's excision to allow for adequate hemostasis.The remaining defect was then measured using calipers and found to be ~8 mm x 8 mm on the scleral bed. Mitomycin C at 0.02% concentration was administered to the surgical bed for 20 seconds then rinsed vigourously with 2 bottles of balanced salt solution.  The superior conjunctiva was then exposed and area 8mm x 8mm was marked using calipers and a marking to outline the conjunctival autograft. Care was taken to avoid involving the superior limbus. Next, lidocaine 1% with epinephrine 1:100,000 was again injected subconjunctivally in order to balloon up the superior conjunctiva. Using 0.12 forceps and sharp Connie scissors, the conjunctiva was dissected off the underlying tenon's. The harvested graft was then moved over to the conjunctival defect where the pterygium was excised. The graft was then glued (stromal side down) using Tisseel fibrin glue.  Cryopreserved amniotic membrane was then brought to the surgical field and cut to a 10 mm x 8 mm size. The amniotic membrane was then placed basement membrane side down over the cornea and glued into place using fibrin glue.   Subconjunctival injections of ancef and dexamethasone were then administered to the inferior fornix. A large diameter Kontour lens (24 mm) was then placed over the ocular surface to help maintain the position of the amniotic membrane and conjunctival autograft and also to help with pain from the superior conjunctival defect.  The lid speculum and drapes were carefully removed. Several  drops of Moxifloxacin and Prednisolone Acetate were placed into the operative eye. An eye patch and an eye shield were taped over the eye. The patient was then taken to the recovery room in stable condition having tolerated the procedure well and discharged home in good condition. Patient is to follow-up next day at eye clinic for post-operative visit.  Dr. El Purcell was scrubbed and performed the entire surgery.  Олег Veliz MD  Fellow, Cornea, External Disease and Refractive Surgery  Department of Ophthalmology  [facility]    El Purcell MD   of Ophthalmology

## 2022-11-15 NOTE — DISCHARGE INSTRUCTIONS
Wear eye patch and shield at all times, DO NOT REMOVE until your clinic visit tomorrow.. Avoid getting the eye wet, rubbing the eye, or hitting the eye. Avoid heavy lifting over 20 lbs, bending over with your head below your waist, and jumping up and down.    You received a blue bag with eye drops after surgery. Please bring this bag to your postoperative visit tomorrow so we can review how to utilize your eyedrops.    For pain please use tylenol over-the-counter as needed.      [facility]  Home Care Following Anesthesia  For 24 hours after surgery:  Get plenty of rest.  A responsible adult must stay with you for at least 24 hours after you leave the surgery center.  Do not drive or use heavy equipment.  If you have weakness or tingling, don't drive or use heavy equipment until this feeling goes away.   Do not drink alcohol.   Avoid strenuous or risky activities.  Ask for help when climbing stairs.  You may feel lightheaded.  IF so, sit for a few minutes before standing.  Have someone help you get up.   If you have nausea (feel sick to your stomach): Drink only clear liquids such as apple juice, ginger ale, broth or 7-Up.  Rest may also help.  Be sure to drink enough fluids.  Move to a regular diet as you feel able.   You may have a slight fever.  Call the doctor if your fever is over 100 F (37.7 C) (taken under the tongue) or lasts longer than 24 hours.  You may have a dry mouth, a sore throat, muscle aches or trouble sleeping. These should go away after 24 hours.  Do not make important or legal decisions.   It is recommended to avoid smoking.               Tips for taking pain medications  To get the best pain relief possible, remember these points:  Take pain medications as directed, before pain becomes severe.  Pain medication can upset your stomach: taking it with food may help.  Constipation is a common side effect of pain medication. Drink plenty of  fluids.  Eat foods  high in fiber. Take a stool softener if recommended by your doctor or pharmacist.  Do not drink alcohol, drive or operate machinery while taking pain medications.  Ask about other ways to control pain, such as with heat, ice or relaxation.    Tylenol/Acetaminophen Consumption  To help encourage the safe use of acetaminophen, the makers of TYLENOL  have lowered the maximum daily dose for single-ingredient Extra Strength TYLENOL  (acetaminophen) products sold in the [place] from 8 pills per day (4,000 mg) to 6 pills per day (3,000 mg). The dosing interval has also changed from 2 pills every 4-6 hours to 2 pills every 6 hours.  If you feel your pain relief is insufficient, you may take Tylenol/Acetaminophen in addition to your narcotic pain medication.   Be careful not to exceed 3,000 mg of Tylenol/Acetaminophen in a 24 hour period from all sources.  If you are taking extra strength Tylenol/acetaminophen (500 mg), the maximum dose is 6 tablets in 24 hours.  If you are taking regular strength acetaminophen (325 mg), the maximum dose is 9 tablets in 24 hours.    Call a doctor for any of the following:  Signs of infection (fever, growing tenderness at the surgery site, a large amount of drainage or bleeding, severe pain, foul-smelling drainage, redness, swelling).  It has been over 8 to 10 hours since surgery and you are still not able to urinate (pass water).  Headache for over 24 hours.  Numbness, tingling or weakness the day after surgery (if you had spinal anesthesia).  Signs of Covid-19 infection (temperature over 100 degrees, shortness of breath, cough, loss of taste/smell, generalized body aches, persistent headache, chills, sore throat, nausea/vomiting/diarrhea)  Your doctor is:  Dr. El Purcell, Ophthalmology: [phone]  Or dial [phone] and ask for the resident on call for:  Ophthalmology  For emergency care, call the:  [facility]:  [phone] (TTY for hearing impaired:  [phone])  Tylenol 975 mg given at 11 am.  Next available dose at 5 pm.

## 2022-11-15 NOTE — ANESTHESIA POSTPROCEDURE EVALUATION
Patient: Jude Zazueta    Procedure: Procedure(s):  LEFT EYE EXCISION, LESION, CONJUNCTIVA; CONJUNCTIVAL AUTOGRAFT; AMNIOTIC MEMBRANE TRANSPLANT; MITOMYCIN C (0.02%) - LEFT EYE       Anesthesia Type:  General    Note:  Disposition: Outpatient   Postop Pain Control: Uneventful            Sign Out: Well controlled pain   PONV: No   Neuro/Psych: Uneventful            Sign Out: Acceptable/Baseline neuro status   Airway/Respiratory: Uneventful            Sign Out: Acceptable/Baseline resp. status   CV/Hemodynamics: Uneventful            Sign Out: Acceptable CV status; No obvious hypovolemia; No obvious fluid overload   Other NRE:    DID A NON-ROUTINE EVENT OCCUR? No           Last vitals:  Vitals Value Taken Time   BP     Temp     Pulse     Resp     SpO2         Electronically Signed By: Titus Zazueta MD, MD  November 15, 2022  1:58 PM

## 2022-11-16 ENCOUNTER — OFFICE VISIT (OUTPATIENT)
Dept: OPHTHALMOLOGY | Facility: CLINIC | Age: 62
End: 2022-11-16
Attending: OPHTHALMOLOGY
Payer: COMMERCIAL

## 2022-11-16 DIAGNOSIS — H53.2 DIPLOPIA: ICD-10-CM

## 2022-11-16 DIAGNOSIS — H11.232 SYMBLEPHARON OF LEFT EYE: Primary | ICD-10-CM

## 2022-11-16 PROCEDURE — 99024 POSTOP FOLLOW-UP VISIT: CPT | Performed by: OPHTHALMOLOGY

## 2022-11-16 PROCEDURE — G0463 HOSPITAL OUTPT CLINIC VISIT: HCPCS | Mod: 25

## 2022-11-16 ASSESSMENT — VISUAL ACUITY
OD_SC: 20/20
OS_SC: 20/70
OD_SC+: -2
METHOD: SNELLEN - LINEAR

## 2022-11-16 ASSESSMENT — EXTERNAL EXAM - LEFT EYE: OS_EXAM: PROPTOSIS IMPROVED

## 2022-11-16 ASSESSMENT — TONOMETRY
IOP_METHOD: ICARE
OD_IOP_MMHG: 22
OS_IOP_MMHG: 20

## 2022-11-16 ASSESSMENT — SLIT LAMP EXAM - LIDS
COMMENTS: DERMATOCHALASIS
COMMENTS: DERMATOCHALASIS

## 2022-11-16 ASSESSMENT — EXTERNAL EXAM - RIGHT EYE: OD_EXAM: PROPTOSIS

## 2022-11-16 NOTE — NURSING NOTE
Chief Complaints and History of Present Illnesses   Patient presents with     Post Op (Ophthalmology) Left Eye     Chief Complaint(s) and History of Present Illness(es)     Post Op (Ophthalmology) Left Eye            Laterality: left eye    Onset: days ago    Quality: Va is blurry    Pain scale: 0/10          Comments    S/p LEFT EYE EXCISION, LESION, CONJUNCTIVA; CONJUNCTIVAL AUTOGRAFT; AMNIOTIC MEMBRANE TRANSPLANT  No pressure or headaches  Marzena Gallagher COT 8:23 AM November 16, 2022

## 2022-11-16 NOTE — PROGRESS NOTES
"CC: Symblepharon evaluation    Referring Provider: Dr. Harsh Carrillo      Initial HPI:    Jude Zazueta 62 year old White male with a history of Grave's disease who recently underwent left lateral wall decompression 5/11/2022 with Dr. Ledzema that was referred for evaluation of symblepharon. Per chart review patient reports diplopia in right gaze that started immediately after his recent surgery. Dr. Carrillo noted a symblepharon at the lateral canthal angle limiting adduction.    Patient notes that for a few weeks after surgery his left eye was puffy and swollen, though as the swelling subsided he noted that when looking to the right he had double vision. He has no double vision when looking straight, and no degradation of clarity of vision. No pain in the eyes, though feels a \"stretching\" sensation when looking to the right. He also notes chronic light sensitivity in both eyes that has been unchanged. No flashes, floaters, curtains.     Interval History: Now POD1 s/p symblepharolysis/MMC/conj autograft/AMT left eye. Reports redness of the left eye with some blurriness. No pain. No flashes, floaters, curtains.     POHx:  Last eye exam: 10/31/2022    Prior eye surgery/laser/Trauma: Lateral orbital decompression, left 5/11/2022, POD1 s/p symblepharolysis/MMC/conj autograft/AMT left eye 11/15/2022    CTL wearer (Brand soft CTL, RGP, Scleral etc.): no    Glasses (SV. Bifocal, Trifocal, PAL): cheaters    Family Hx of eye disease: None      PMHx:   Past Medical History:   Diagnosis Date     Graves disease      HTN (hypertension)        PSHx:   Past Surgical History:   Procedure Laterality Date     COLONOSCOPY  10-    adenoma polyps x 2     OPTICAL TRACKING SYSTEM ORBITOTOMY Left 5/11/2022    Procedure: Left orbital decompression and Sonopet Left Eye;  Surgeon: Marko Ledezma MD;  Location: [facility] OR     PROSTATE CANCER SURGERY       SONOPET EYE Left 5/11/2022    Procedure: SONOPET EYE;  Surgeon: Marko Ledezma, " MD;  Location: [facility] [place]       [facility] Taking:  Artificial Tears PRN    Allergies:  Lisinopril  Nifedipine    Assessment:  #Symblepharon, Left eye  - POD1 s/p symblepharolysis/MMC/conj autograft/AMT left eye 11/15/2022  - Start prednisolone QID left eye  - Start ofloxacin QID left eye  - No water in the eye; no eye rubbing  - Leave Kontour lens in place for now  - Discussed RD/Endophthalmitis precautions  - RTC POW1    Follow up:  Post-op week #1, V,T, at next visit, call if problems sooner to clinic.    ALSO SEES PROVIDERS:  Dr. Gerardo Veliz MD  Fellow, Cornea, External Disease and Refractive Surgery  Department of Ophthalmology  [facility]    Attending Physician Attestation:  Complete documentation of historical and exam elements from today's encounter can be found in the full encounter summary report (not reduplicated in this progress note).  I personally obtained the chief complaint(s) and history of present illness.  I confirmed and edited as necessary the review of systems, past medical/surgical history, family history, social history, and examination findings as documented by others; and I examined the patient myself.  I personally reviewed the relevant tests, images, and reports as documented above.  I formulated and edited as necessary the assessment and plan and discussed the findings and management plan with the patient and family. - El Purcell MD

## 2022-11-21 ENCOUNTER — OFFICE VISIT (OUTPATIENT)
Dept: OPHTHALMOLOGY | Facility: CLINIC | Age: 62
End: 2022-11-21
Attending: OPHTHALMOLOGY
Payer: COMMERCIAL

## 2022-11-21 DIAGNOSIS — H11.232 SYMBLEPHARON OF LEFT EYE: ICD-10-CM

## 2022-11-21 PROCEDURE — 99024 POSTOP FOLLOW-UP VISIT: CPT | Mod: GC | Performed by: OPHTHALMOLOGY

## 2022-11-21 PROCEDURE — G0463 HOSPITAL OUTPT CLINIC VISIT: HCPCS

## 2022-11-21 RX ORDER — OFLOXACIN 3 MG/ML
1 SOLUTION/ DROPS OPHTHALMIC 4 TIMES DAILY
Qty: 5 ML | Refills: 3 | Status: SHIPPED | OUTPATIENT
Start: 2022-11-21 | End: 2023-08-29

## 2022-11-21 RX ORDER — PREDNISOLONE ACETATE 10 MG/ML
1 SUSPENSION/ DROPS OPHTHALMIC 4 TIMES DAILY
Qty: 5 ML | Refills: 3 | Status: SHIPPED | OUTPATIENT
Start: 2022-11-21 | End: 2023-08-29

## 2022-11-21 ASSESSMENT — SLIT LAMP EXAM - LIDS
COMMENTS: DERMATOCHALASIS
COMMENTS: DERMATOCHALASIS

## 2022-11-21 ASSESSMENT — TONOMETRY
OS_IOP_MMHG: 19
OD_IOP_MMHG: 18
IOP_METHOD: ICARE

## 2022-11-21 ASSESSMENT — VISUAL ACUITY
METHOD: SNELLEN - LINEAR
OS_PH_SC: 20/40
OS_SC: 20/70
OD_SC: 20/20

## 2022-11-21 ASSESSMENT — EXTERNAL EXAM - LEFT EYE: OS_EXAM: PROPTOSIS IMPROVED

## 2022-11-21 ASSESSMENT — EXTERNAL EXAM - RIGHT EYE: OD_EXAM: PROPTOSIS

## 2022-11-21 NOTE — PROGRESS NOTES
"CC: s/p symblepharolysis/MMC/conj autograft/AMT left eye    Referring Provider: Dr. Harsh Carrillo    Initial HPI:    Jude Zazueta 62 year old White male with a history of Grave's disease who recently underwent left lateral wall decompression 5/11/2022 with Dr. Ledezma that was referred for evaluation of symblepharon. Per chart review patient reports diplopia in right gaze that started immediately after his recent surgery. Dr. Carrillo noted a symblepharon at the lateral canthal angle limiting adduction. Patient notes that for a few weeks after surgery his left eye was puffy and swollen, though as the swelling subsided he noted that when looking to the right he had double vision. He has no double vision when looking straight, and no degradation of clarity of vision. No pain in the eyes, though feels a \"stretching\" sensation when looking to the right. He also notes chronic light sensitivity in both eyes that has been unchanged. No flashes, floaters, curtains.     Interval History: Now POW1 s/p symblepharolysis/MMC/conj autograft/AMT left eye. Reports redness of the left eye with stable blurriness. Reports some soreness of the left eye. No flashes, floaters, curtains.     POHx:  Last eye exam: 11/16/2022    Prior eye surgery/laser/Trauma: Lateral orbital decompression, left 5/11/2022, POD1 s/p symblepharolysis/MMC/conj autograft/AMT left eye 11/15/2022    CTL wearer (Brand soft CTL, RGP, Scleral etc.): no    Glasses (SV. Bifocal, Trifocal, PAL): cheaters    Family Hx of eye disease: None      PMHx:   Past Medical History:   Diagnosis Date     Graves disease      HTN (hypertension)        PSHx:   Past Surgical History:   Procedure Laterality Date     COLONOSCOPY  10-    adenoma polyps x 2     EXCISE LESION CONJUNCTIVAL Left 11/15/2022    Procedure: LEFT EYE EXCISION, LESION, CONJUNCTIVA; CONJUNCTIVAL AUTOGRAFT; AMNIOTIC MEMBRANE TRANSPLANT; MITOMYCIN C (0.02%) - LEFT EYE;  Surgeon: El Purcell MD;  " Location: [facility] [place] OR     OPTICAL TRACKING SYSTEM ORBITOTOMY Left 5/11/2022    Procedure: Left orbital decompression and Sonopet Left Eye;  Surgeon: Marko Ledezma MD;  Location: [facility] [place] OR     PROSTATE CANCER SURGERY       SONOPET EYE Left 5/11/2022    Procedure: SONOPET EYE;  Surgeon: Marko Ledezma MD;  Location: [facility] [place] OR       [facility] Taking:  Artificial Tears PRN    Allergies:  Lisinopril  Nifedipine    Assessment:  #Symblepharon, Left eye  - POW1 s/p symblepharolysis/MMC/conj autograft/AMT left eye 11/15/2022  - Continue prednisolone QID left eye  - Continue ofloxacin QID left eye  - No water in the eye; no eye rubbing  - Leave Kontour lens in place for now  - Discussed RD/Endophthalmitis precautions  - RTC POM1    Follow up:  Postoperative month 1, V,T, at next visit, call if problems sooner to clinic.    ALSO SEES PROVIDERS:  Dr. Gerardo Veliz MD  Fellow, Cornea, External Disease and Refractive Surgery  Department of Ophthalmology  [facility]    Attending Physician Attestation:  Complete documentation of historical and exam elements from today's encounter can be found in the full encounter summary report (not reduplicated in this progress note).  I personally obtained the chief complaint(s) and history of present illness.  I confirmed and edited as necessary the review of systems, past medical/surgical history, family history, social history, and examination findings as documented by others; and I examined the patient myself.  I personally reviewed the relevant tests, images, and reports as documented above.  I formulated and edited as necessary the assessment and plan and discussed the findings and management plan with the patient and family. - Meg Harris MD

## 2022-11-21 NOTE — NURSING NOTE
Chief Complaints and History of Present Illnesses   Patient presents with     Post Op (Ophthalmology) Left Eye     LEFT EYE EXCISION, LESION, CONJUNCTIVA; CONJUNCTIVAL AUTOGRAFT; AMNIOTIC MEMBRANE TRANSPLANT; MITOMYCIN C (0.02%) - LE DOS 11/15/22     Chief Complaint(s) and History of Present Illness(es)     Post Op (Ophthalmology) Left Eye            Laterality: left eye    Onset: 1 week ago    Quality: Va is blurry    Associated symptoms: redness.  Negative for dryness, eye pain, flashes and floaters    Treatments tried: eye drops    Pain scale: 4/10    Comments: LEFT EYE EXCISION, LESION, CONJUNCTIVA; CONJUNCTIVAL AUTOGRAFT; AMNIOTIC MEMBRANE TRANSPLANT; MITOMYCIN C (0.02%) - LE DOS 11/15/22          Comments    Pt here today for 1 week post procedure care visit.  States healing is going slowly - pain & redness is better.  Vision blurry - pt states BCL still in place as far as he knows.  Reports following drops regimen as directed.    Lia WAGGONER, MIRIAM 9:12 AM 11/21/2022

## 2022-12-03 ENCOUNTER — HEALTH MAINTENANCE LETTER (OUTPATIENT)
Age: 62
End: 2022-12-03

## 2022-12-05 ENCOUNTER — MEDICAL CORRESPONDENCE (OUTPATIENT)
Dept: CT IMAGING | Facility: HOSPITAL | Age: 62
End: 2022-12-05

## 2022-12-07 ENCOUNTER — HOSPITAL ENCOUNTER (OUTPATIENT)
Dept: CT IMAGING | Facility: HOSPITAL | Age: 62
Discharge: HOME | End: 2022-12-07
Attending: FAMILY MEDICINE | Admitting: FAMILY MEDICINE
Payer: COMMERCIAL

## 2022-12-07 DIAGNOSIS — I71.21 ANEURYSM OF THE ASCENDING AORTA, WITHOUT RUPTURE (H): ICD-10-CM

## 2022-12-07 PROCEDURE — 71275 CT ANGIOGRAPHY CHEST: CPT

## 2022-12-07 PROCEDURE — 250N000011 HC RX IP 250 OP 636: Performed by: RADIOLOGY

## 2022-12-07 RX ORDER — IOPAMIDOL 755 MG/ML
100 INJECTION, SOLUTION INTRAVASCULAR ONCE
Status: COMPLETED | OUTPATIENT
Start: 2022-12-07 | End: 2022-12-07

## 2022-12-07 RX ADMIN — IOPAMIDOL 100 ML: 755 INJECTION, SOLUTION INTRAVENOUS at 08:53

## 2022-12-14 ENCOUNTER — OFFICE VISIT (OUTPATIENT)
Dept: OPHTHALMOLOGY | Facility: CLINIC | Age: 62
End: 2022-12-14
Attending: OPHTHALMOLOGY
Payer: COMMERCIAL

## 2022-12-14 DIAGNOSIS — H53.2 DIPLOPIA: ICD-10-CM

## 2022-12-14 DIAGNOSIS — H11.232 SYMBLEPHARON OF LEFT EYE: Primary | ICD-10-CM

## 2022-12-14 PROCEDURE — G0463 HOSPITAL OUTPT CLINIC VISIT: HCPCS

## 2022-12-14 PROCEDURE — 99024 POSTOP FOLLOW-UP VISIT: CPT | Mod: GC | Performed by: OPHTHALMOLOGY

## 2022-12-14 ASSESSMENT — SLIT LAMP EXAM - LIDS
COMMENTS: DERMATOCHALASIS
COMMENTS: DERMATOCHALASIS

## 2022-12-14 ASSESSMENT — CONF VISUAL FIELD
OS_SUPERIOR_TEMPORAL_RESTRICTION: 0
OD_SUPERIOR_TEMPORAL_RESTRICTION: 0
OD_INFERIOR_TEMPORAL_RESTRICTION: 0
OD_INFERIOR_NASAL_RESTRICTION: 0
OD_SUPERIOR_NASAL_RESTRICTION: 0
OS_INFERIOR_TEMPORAL_RESTRICTION: 0
OD_NORMAL: 1
METHOD: COUNTING FINGERS
OS_SUPERIOR_NASAL_RESTRICTION: 0
OS_INFERIOR_NASAL_RESTRICTION: 0
OS_NORMAL: 1

## 2022-12-14 ASSESSMENT — VISUAL ACUITY
OS_PH_SC+: -1
OD_SC: 20/20
OS_PH_SC: 20/20
OS_SC: 20/40
METHOD: SNELLEN - LINEAR
OS_SC+: -2

## 2022-12-14 ASSESSMENT — TONOMETRY
OD_IOP_MMHG: 18
IOP_METHOD: ICARE
OS_IOP_MMHG: 23

## 2022-12-14 ASSESSMENT — EXTERNAL EXAM - LEFT EYE: OS_EXAM: PROPTOSIS IMPROVED

## 2022-12-14 ASSESSMENT — EXTERNAL EXAM - RIGHT EYE: OD_EXAM: PROPTOSIS

## 2022-12-14 NOTE — PROGRESS NOTES
"CC: Symblepharon evaluation    Referring Provider: Dr. Harsh Carrillo      Initial HPI:    Jude Zazueta 62 year old White male with a history of Grave's disease who recently underwent left lateral wall decompression 5/11/2022 with Dr. Ledezma that was referred for evaluation of symblepharon. Per chart review patient reports diplopia in right gaze that started immediately after his recent surgery. Dr. Carrillo noted a symblepharon at the lateral canthal angle limiting adduction.    Patient notes that for a few weeks after surgery his left eye was puffy and swollen, though as the swelling subsided he noted that when looking to the right he had double vision. He has no double vision when looking straight, and no degradation of clarity of vision. No pain in the eyes, though feels a \"stretching\" sensation when looking to the right. He also notes chronic light sensitivity in both eyes that has been unchanged. No flashes, floaters, curtains.     Interval History: Now POM1 s/p symblepharolysis/MMC/conj autograft/AMT left eye. Reports redness of the left eye with some blurriness. No pain. No flashes, floaters, curtains.     POHx:  Last eye exam: 10/31/2022    Prior eye surgery/laser/Trauma: Lateral orbital decompression, left 5/11/2022, POD1 s/p symblepharolysis/MMC/conj autograft/AMT left eye 11/15/2022    CTL wearer (Brand soft CTL, RGP, Scleral etc.): no    Glasses (SV. Bifocal, Trifocal, PAL): cheaters    Family Hx of eye disease: None      PMHx:   Past Medical History:   Diagnosis Date     Graves disease      HTN (hypertension)        PSHx:   Past Surgical History:   Procedure Laterality Date     COLONOSCOPY  10-    adenoma polyps x 2     EXCISE LESION CONJUNCTIVAL Left 11/15/2022    Procedure: LEFT EYE EXCISION, LESION, CONJUNCTIVA; CONJUNCTIVAL AUTOGRAFT; AMNIOTIC MEMBRANE TRANSPLANT; MITOMYCIN C (0.02%) - LEFT EYE;  Surgeon: El Purcell MD;  Location: [facility] OR     Ornicept SYSTEM ORBITOTOMY Left " 5/11/2022    Procedure: Left orbital decompression and Sonopet Left Eye;  Surgeon: Marko Ledezam MD;  Location: [facility] OR     PROSTATE CANCER SURGERY       SONOPET EYE Left 5/11/2022    Procedure: SONOPET EYE;  Surgeon: Marko Ldeezma MD;  Location: [facility] OR       Gtts Currenly Taking:  Artificial Tears PRN    Allergies:  Lisinopril  Nifedipine    Assessment:  #Symblepharon, Left eye  - POM1 s/p symblepharolysis/MMC/conj autograft/AMT left eye 11/15/2022  - Start prednisolone taper 3-2-1 every 2 weeks left eye  - discontinue ofloxacin QID left eye (temporal area epithelialized)  - No water in the eye; no eye rubbing  - Leave Kontour lens in place for now  - Discussed RD/Endophthalmitis precautions  - RTC POM3    Follow up:  Post-op month 3, V,T, at next visit, call if problems sooner to clinic.    ALSO SEES PROVIDERS:  Dr. Gerardo Ledezma    Attending Physician Attestation:  Complete documentation of historical and exam elements from today's encounter can be found in the full encounter summary report (not reduplicated in this progress note).  I personally obtained the chief complaint(s) and history of present illness.  I confirmed and edited as necessary the review of systems, past medical/surgical history, family history, social history, and examination findings as documented by others; and I examined the patient myself.  I personally reviewed the relevant tests, images, and reports as documented above.  I formulated and edited as necessary the assessment and plan and discussed the findings and management plan with the patient and family. - El Purcell MD         Hemostasis: Drysol Size Of Lesion In Cm: 0 Dressing: bandage Electrodesiccation And Curettage Text: The wound bed was treated with electrodesiccation and curettage after the biopsy was performed. Notification Instructions: Patient will be notified of biopsy results. However, patient instructed to call the office if not contacted within 2 weeks. Silver Nitrate Text: The wound bed was treated with silver nitrate after the biopsy was performed. Destruction After The Procedure: No Post-Care Instructions: I reviewed with the patient in detail post-care instructions. Patient is to keep the biopsy site dry overnight, and then apply bacitracin twice daily until healed. Patient may apply hydrogen peroxide soaks to remove any crusting. Wound Care: Bacitracin Lab: [id number] Anesthesia Volume In Cc (Will Not Render If 0): 0.5 Billing Type: Third-Party Bill Detail Level: Detailed Anesthesia Type: 1% lidocaine with epinephrine Lab Facility: [id number] Biopsy Type: H and E Biopsy Method: Dermablade Electrodesiccation Text: The wound bed was treated with electrodesiccation after the biopsy was performed. Type Of Destruction Used: Curettage Cryotherapy Text: The wound bed was treated with cryotherapy after the biopsy was performed. Consent: Written consent was obtained and risks were reviewed including but not limited to scarring, infection, bleeding, scabbing, incomplete removal, nerve damage and allergy to anesthesia. Curettage Text: The wound bed was treated with curettage after the biopsy was performed.

## 2022-12-14 NOTE — LETTER
December 14, 2022      Re: Jude Zazueta   1960    To Whom It May Concern:    This is to confirm that the above patient was seen on 12/14/2022.  Jude Zazueta is able to return to work tomorrow 12/15/2022 without restriction.    Thank you for your cooperation in this matter.  Please do not hesitate to contact me if you have any further questions.    Sincerely,        JACQUI AYALA MD

## 2022-12-14 NOTE — NURSING NOTE
Chief Complaints and History of Present Illnesses   Patient presents with     Follow Up     3 week follow up : s/p symblepharolysis/MMC/conj autograft/AMT left eye     Chief Complaint(s) and History of Present Illness(es)     Follow Up            Comments: 3 week follow up : s/p symblepharolysis/MMC/conj autograft/AMT left eye          Comments    Pt state vision appears better, blurry every now and then. No eye pain today.   No new flashes or floaters. No redness or dryness.    RETA Contreras December 14, 2022 10:22 AM

## 2023-01-20 ENCOUNTER — OFFICE VISIT (OUTPATIENT)
Dept: FAMILY MEDICINE | Facility: OTHER | Age: 63
End: 2023-01-20
Attending: FAMILY MEDICINE
Payer: COMMERCIAL

## 2023-01-20 ENCOUNTER — TELEPHONE (OUTPATIENT)
Dept: FAMILY MEDICINE | Facility: OTHER | Age: 63
End: 2023-01-20

## 2023-01-20 ENCOUNTER — LAB (OUTPATIENT)
Dept: LAB | Facility: OTHER | Age: 63
End: 2023-01-20
Payer: COMMERCIAL

## 2023-01-20 VITALS
DIASTOLIC BLOOD PRESSURE: 86 MMHG | OXYGEN SATURATION: 97 % | RESPIRATION RATE: 16 BRPM | HEART RATE: 74 BPM | TEMPERATURE: 97.9 F | WEIGHT: 270 LBS | HEIGHT: 73 IN | BODY MASS INDEX: 35.78 KG/M2 | SYSTOLIC BLOOD PRESSURE: 128 MMHG

## 2023-01-20 DIAGNOSIS — E66.01 MORBID OBESITY (H): ICD-10-CM

## 2023-01-20 DIAGNOSIS — N18.31 STAGE 3A CHRONIC KIDNEY DISEASE (H): ICD-10-CM

## 2023-01-20 DIAGNOSIS — I48.91 ATRIAL FIBRILLATION, UNSPECIFIED TYPE (H): Chronic | ICD-10-CM

## 2023-01-20 DIAGNOSIS — I10 PRIMARY HYPERTENSION: Chronic | ICD-10-CM

## 2023-01-20 DIAGNOSIS — T50.8X5D ADVERSE EFFECT OF CONTRAST MEDIA, SUBSEQUENT ENCOUNTER: ICD-10-CM

## 2023-01-20 DIAGNOSIS — N17.9 ACUTE RENAL FAILURE, UNSPECIFIED ACUTE RENAL FAILURE TYPE (H): ICD-10-CM

## 2023-01-20 DIAGNOSIS — I71.21 ANEURYSM OF ASCENDING AORTA WITHOUT RUPTURE (H): ICD-10-CM

## 2023-01-20 DIAGNOSIS — C61 MALIGNANT NEOPLASM OF PROSTATE (H): ICD-10-CM

## 2023-01-20 DIAGNOSIS — E05.00 GRAVES DISEASE: Primary | Chronic | ICD-10-CM

## 2023-01-20 LAB
ALBUMIN UR-MCNC: NEGATIVE MG/DL
ANION GAP SERPL CALCULATED.3IONS-SCNC: 9 MMOL/L (ref 7–15)
APPEARANCE UR: CLEAR
BASOPHILS # BLD AUTO: 0 10E3/UL (ref 0–0.2)
BASOPHILS NFR BLD AUTO: 0 %
BILIRUB UR QL STRIP: NEGATIVE
BUN SERPL-MCNC: 25.1 MG/DL (ref 8–23)
CALCIUM SERPL-MCNC: 9.7 MG/DL (ref 8.8–10.2)
CHLORIDE SERPL-SCNC: 104 MMOL/L (ref 98–107)
COLOR UR AUTO: NORMAL
CREAT SERPL-MCNC: 1.49 MG/DL (ref 0.67–1.17)
DEPRECATED HCO3 PLAS-SCNC: 27 MMOL/L (ref 22–29)
EOSINOPHIL # BLD AUTO: 0.2 10E3/UL (ref 0–0.7)
EOSINOPHIL NFR BLD AUTO: 2 %
ERYTHROCYTE [DISTWIDTH] IN BLOOD BY AUTOMATED COUNT: 12.5 % (ref 10–15)
GFR SERPL CREATININE-BSD FRML MDRD: 52 ML/MIN/1.73M2
GLUCOSE SERPL-MCNC: 110 MG/DL (ref 70–99)
GLUCOSE UR STRIP-MCNC: NEGATIVE MG/DL
HCT VFR BLD AUTO: 48.1 % (ref 40–53)
HGB BLD-MCNC: 16.3 G/DL (ref 13.3–17.7)
HGB UR QL STRIP: NEGATIVE
IMM GRANULOCYTES # BLD: 0 10E3/UL
IMM GRANULOCYTES NFR BLD: 1 %
KETONES UR STRIP-MCNC: NEGATIVE MG/DL
LEUKOCYTE ESTERASE UR QL STRIP: NEGATIVE
LYMPHOCYTES # BLD AUTO: 1.4 10E3/UL (ref 0.8–5.3)
LYMPHOCYTES NFR BLD AUTO: 17 %
MCH RBC QN AUTO: 30.9 PG (ref 26.5–33)
MCHC RBC AUTO-ENTMCNC: 33.9 G/DL (ref 31.5–36.5)
MCV RBC AUTO: 91 FL (ref 78–100)
MONOCYTES # BLD AUTO: 0.8 10E3/UL (ref 0–1.3)
MONOCYTES NFR BLD AUTO: 10 %
NEUTROPHILS # BLD AUTO: 5.6 10E3/UL (ref 1.6–8.3)
NEUTROPHILS NFR BLD AUTO: 70 %
NITRATE UR QL: NEGATIVE
NRBC # BLD AUTO: 0 10E3/UL
NRBC BLD AUTO-RTO: 0 /100
PH UR STRIP: 5 [PH] (ref 4.7–8)
PLATELET # BLD AUTO: 204 10E3/UL (ref 150–450)
POTASSIUM SERPL-SCNC: 4.5 MMOL/L (ref 3.4–5.3)
RBC # BLD AUTO: 5.27 10E6/UL (ref 4.4–5.9)
SODIUM SERPL-SCNC: 140 MMOL/L (ref 136–145)
SP GR UR STRIP: 1.01 (ref 1–1.03)
UROBILINOGEN UR STRIP-MCNC: NORMAL MG/DL
WBC # BLD AUTO: 8 10E3/UL (ref 4–11)

## 2023-01-20 PROCEDURE — 99214 OFFICE O/P EST MOD 30 MIN: CPT | Performed by: FAMILY MEDICINE

## 2023-01-20 PROCEDURE — 81003 URINALYSIS AUTO W/O SCOPE: CPT | Performed by: FAMILY MEDICINE

## 2023-01-20 PROCEDURE — 36415 COLL VENOUS BLD VENIPUNCTURE: CPT

## 2023-01-20 PROCEDURE — 85025 COMPLETE CBC W/AUTO DIFF WBC: CPT

## 2023-01-20 PROCEDURE — 80048 BASIC METABOLIC PNL TOTAL CA: CPT

## 2023-01-20 RX ORDER — METOPROLOL TARTRATE 100 MG
100 TABLET ORAL 2 TIMES DAILY
Qty: 180 TABLET | Refills: 3 | Status: SHIPPED | OUTPATIENT
Start: 2023-01-20

## 2023-01-20 ASSESSMENT — PAIN SCALES - GENERAL: PAINLEVEL: SEVERE PAIN (6)

## 2023-01-20 NOTE — PROGRESS NOTES
Assessment & Plan     Graves disease  Managed through [facility], will get records.     Primary hypertension / Aneurysm of ascending aorta without rupture / Atrial fibrillation, unspecified type (H)  Controlled today, discussed importance of BP control with aortic aneurysm (chart and CT reviewed - noted incidentally on CT lung ca screening), will increase lopressor to 100mg bid.   - metoprolol tartrate (LOPRESSOR) 100 MG tablet  Dispense: 180 tablet; Refill: 3    Stage 3a chronic kidney disease (H)  Pt had bump in renal function over the last 2 months, unclear cause. Will continue aldactone for now, UA and Renal US ordered. Consider dose reduction in aldactone, however, BUN/Cr does not seem to indicate dehydration, K is okay.   - Basic metabolic panel  - CBC with platelets and differential  - UA reflex to Microscopic and Culture - [place]  - US Renal Complete Non-Vascular    Morbid obesity (H)  Weight is stable    Adverse effect of contrast media, subsequent encounter  Pt had flushing, chills, myalgia/arthralgia after CT contrast, updated allergy list, consider medrol with any additional scans.     30 minutes spent on the date of the encounter doing chart review, review of test results, interpretation of tests, patient visit and documentation       Return in about 1 month (around 2/20/2023) for kidney.    Rosmery Daly MD  [facility] - [place] Valverde is a 62 year old, presenting for the following health issues:    Hypertension and Kidney Problem    HPI   Med review    Kidney recheck    Hypertension Follow-up      Do you check your blood pressure regularly outside of the clinic? No     Are you following a low salt diet? No    Are your blood pressures ever more than 140 on the top number (systolic) OR more   than 90 on the bottom number (diastolic), for example 140/90? Yes    Chronic Kidney Disease Follow-up  Do you take any over the counter pain medicine?: Yes  What over the counter  "medicine are you taking for your pain?:  Tylenol      How often do you take this medicine?:  One time daily    PROCEDURE:  CTA CHEST WITH CONTRAST.     HISTORY:  Aneurysm of the ascending aorta, without rupture     TECHNIQUE:  Initial pre-contrast  and localizer images were  obtained.  Contrast enhanced helical CT thoracic angiography was then  performed.  Routine transaxial and post-processed (multiplanar and/or  MIP) reformations were obtained. This CT exam was performed using one  or more the following dose reduction techniques: automated exposure  control, adjustment of the mA and/or kV according to patient size,  and/or iterative reconstruction technique.     COMPARISON:  10/6/22     MEDS/CONTRAST: ISOVUE 370  100mL     CTA FINDINGS:  The mid ascending thoracic aorta is dilated up to 4.1  cm, unchanged. No acute pulmonary embolus is identified.     OTHER FINDINGS:       The heart is enlarged. A multinodular goiter is similar to prior. No  suspicious thoracic adenopathy is identified.      Limited views of the upper abdomen reveal no adrenal mass or acute  process. Fatty atrophy of the pancreas is noted.      The pleura is without thickening or effusions. The central airways are  unremarkable. No focal consolidation. 4 mm nodules in the right upper  lobe and left lower lobe are unchanged.     No suspicious osseous lesion is identified.                                                                      IMPRESSION:     Unchanged dilation of the mid ascending thoracic aorta measuring up to  4.1 cm.     NADIYA DEAL MD     Review of Systems         Objective    /86 (BP Location: Left arm, Patient Position: Sitting, Cuff Size: Adult Large)   Pulse 74   Temp 97.9  F (36.6  C) (Tympanic)   Resp 16   Ht 1.854 m (6' 1\")   Wt 122.5 kg (270 lb)   SpO2 97%   BMI 35.62 kg/m    Body mass index is 35.62 kg/m .  Physical Exam   GENERAL: alert and no distress  NECK: no adenopathy, no asymmetry, masses, " or scars and thyroid normal to palpation  RESP: lungs clear to auscultation - no rales, rhonchi or wheezes  CV: regular rates and rhythm, normal S1 S2, no S3 or S4, no murmur, click or rub and no peripheral edema  MS: no gross musculoskeletal defects noted, no edema  SKIN: no suspicious lesions or rashes  NEURO: Normal strength and tone, mentation intact and speech normal  PSYCH: mentation appears normal, affect normal/bright    Results for orders placed or performed in visit on 01/20/23   Basic metabolic panel     Status: Abnormal   Result Value Ref Range    Sodium 140 136 - 145 mmol/L    Potassium 4.5 3.4 - 5.3 mmol/L    Chloride 104 98 - 107 mmol/L    Carbon Dioxide (CO2) 27 22 - 29 mmol/L    Anion Gap 9 7 - 15 mmol/L    Urea Nitrogen 25.1 (H) 8.0 - 23.0 mg/dL    Creatinine 1.49 (H) 0.67 - 1.17 mg/dL    Calcium 9.7 8.8 - 10.2 mg/dL    Glucose 110 (H) 70 - 99 mg/dL    GFR Estimate 52 (L) >60 mL/min/1.73m2   CBC with platelets and differential     Status: None   Result Value Ref Range    WBC Count 8.0 4.0 - 11.0 10e3/uL    RBC Count 5.27 4.40 - 5.90 10e6/uL    Hemoglobin 16.3 13.3 - 17.7 g/dL    Hematocrit 48.1 40.0 - 53.0 %    MCV 91 78 - 100 fL    MCH 30.9 26.5 - 33.0 pg    MCHC 33.9 31.5 - 36.5 g/dL    RDW 12.5 10.0 - 15.0 %    Platelet Count 204 150 - 450 10e3/uL    % Neutrophils 70 %    % Lymphocytes 17 %    % Monocytes 10 %    % Eosinophils 2 %    % Basophils 0 %    % Immature Granulocytes 1 %    NRBCs per 100 WBC 0 <1 /100    Absolute Neutrophils 5.6 1.6 - 8.3 10e3/uL    Absolute Lymphocytes 1.4 0.8 - 5.3 10e3/uL    Absolute Monocytes 0.8 0.0 - 1.3 10e3/uL    Absolute Eosinophils 0.2 0.0 - 0.7 10e3/uL    Absolute Basophils 0.0 0.0 - 0.2 10e3/uL    Absolute Immature Granulocytes 0.0 <=0.4 10e3/uL    Absolute NRBCs 0.0 10e3/uL   CBC with platelets and differential     Status: None    Narrative    The following orders were created for panel order CBC with platelets and differential.  Procedure                                Abnormality         Status                     ---------                               -----------         ------                     CBC with platelets and d...[[id number]]                      Final result                 Please view results for these tests on the individual orders.   Results for orders placed or performed in visit on 01/20/23   UA reflex to Microscopic and Culture - [place]     Status: Normal    Specimen: Urine, Midstream   Result Value Ref Range    Color Urine Straw Colorless, Straw, Light Yellow, Yellow    Appearance Urine Clear Clear    Glucose Urine Negative Negative mg/dL    Bilirubin Urine Negative Negative    Ketones Urine Negative Negative mg/dL    Specific Gravity Urine 1.008 1.003 - 1.035    Blood Urine Negative Negative    pH Urine 5.0 4.7 - 8.0    Protein Albumin Urine Negative Negative mg/dL    Urobilinogen Urine Normal Normal, 2.0 mg/dL    Nitrite Urine Negative Negative    Leukocyte Esterase Urine Negative Negative    Narrative    Microscopic not indicated

## 2023-01-26 ENCOUNTER — HOSPITAL ENCOUNTER (OUTPATIENT)
Dept: ULTRASOUND IMAGING | Facility: HOSPITAL | Age: 63
Discharge: HOME | End: 2023-01-26
Attending: FAMILY MEDICINE | Admitting: FAMILY MEDICINE
Payer: COMMERCIAL

## 2023-01-26 DIAGNOSIS — N18.31 STAGE 3A CHRONIC KIDNEY DISEASE (H): ICD-10-CM

## 2023-01-26 PROCEDURE — 76770 US EXAM ABDO BACK WALL COMP: CPT

## 2023-02-15 ENCOUNTER — OFFICE VISIT (OUTPATIENT)
Dept: OPHTHALMOLOGY | Facility: CLINIC | Age: 63
End: 2023-02-15
Attending: OPHTHALMOLOGY
Payer: COMMERCIAL

## 2023-02-15 DIAGNOSIS — E05.00 GRAVES DISEASE: Chronic | ICD-10-CM

## 2023-02-15 DIAGNOSIS — H53.2 DIPLOPIA: ICD-10-CM

## 2023-02-15 DIAGNOSIS — H11.232 SYMBLEPHARON OF LEFT EYE: Primary | ICD-10-CM

## 2023-02-15 DIAGNOSIS — H40.053 BORDERLINE GLAUCOMA OF BOTH EYES WITH OCULAR HYPERTENSION: ICD-10-CM

## 2023-02-15 PROCEDURE — 92133 CPTRZD OPH DX IMG PST SGM ON: CPT | Performed by: OPHTHALMOLOGY

## 2023-02-15 PROCEDURE — 99215 OFFICE O/P EST HI 40 MIN: CPT | Mod: GC | Performed by: OPHTHALMOLOGY

## 2023-02-15 PROCEDURE — G0463 HOSPITAL OUTPT CLINIC VISIT: HCPCS

## 2023-02-15 ASSESSMENT — CONF VISUAL FIELD
OD_SUPERIOR_NASAL_RESTRICTION: 0
OS_INFERIOR_TEMPORAL_RESTRICTION: 0
OS_SUPERIOR_TEMPORAL_RESTRICTION: 0
OD_INFERIOR_TEMPORAL_RESTRICTION: 0
METHOD: COUNTING FINGERS
OS_SUPERIOR_NASAL_RESTRICTION: 0
OD_INFERIOR_NASAL_RESTRICTION: 0
OD_NORMAL: 1
OS_INFERIOR_NASAL_RESTRICTION: 0
OS_NORMAL: 1
OD_SUPERIOR_TEMPORAL_RESTRICTION: 0

## 2023-02-15 ASSESSMENT — SLIT LAMP EXAM - LIDS
COMMENTS: DERMATOCHALASIS
COMMENTS: DERMATOCHALASIS

## 2023-02-15 ASSESSMENT — TONOMETRY
OD_IOP_MMHG: 21
OS_IOP_MMHG: 22
IOP_METHOD: ICARE

## 2023-02-15 ASSESSMENT — EXTERNAL EXAM - RIGHT EYE: OD_EXAM: PROPTOSIS

## 2023-02-15 ASSESSMENT — VISUAL ACUITY
OS_SC: 20/25
OS_SC+: -2
OD_SC+: -2
OD_SC: 20/20
METHOD: SNELLEN - LINEAR

## 2023-02-15 ASSESSMENT — EXTERNAL EXAM - LEFT EYE: OS_EXAM: PROPTOSIS IMPROVED

## 2023-02-15 NOTE — PROGRESS NOTES
"CC: Symblepharon evaluation    Referring Provider: Dr. Harsh Carrillo      Initial HPI:    Jude Zazueta 62 year old White male with a history of Grave's disease who recently underwent left lateral wall decompression 5/11/2022 with Dr. Ledezma that was referred for evaluation of symblepharon. Per chart review patient reports diplopia in right gaze that started immediately after his recent surgery. Dr. Carrillo noted a symblepharon at the lateral canthal angle limiting adduction.    Patient notes that for a few weeks after surgery his left eye was puffy and swollen, though as the swelling subsided he noted that when looking to the right he had double vision. He has no double vision when looking straight, and no degradation of clarity of vision. No pain in the eyes, though feels a \"stretching\" sensation when looking to the right. He also notes chronic light sensitivity in both eyes that has been unchanged. No flashes, floaters, curtains.     Interval History 2/15/23: Now POM3 s/p symblepharolysis/MMC/conj autograft/AMT left eye (11/15/22). Patient reports he still feels tightness in extremes of gaze, overall improved since before surgery. The severe diplopia has improved, but still has diplopia in upgaze. No eye pain.       POHx:    Prior eye surgery/laser/Trauma: Lateral orbital decompression, left 5/11/2022,  s/p symblepharolysis/MMC/conj autograft/AMT left eye 11/15/2022    CTL wearer (Brand soft CTL, RGP, Scleral etc.): no    Glasses (SV. Bifocal, Trifocal, PAL): cheaters    Family Hx of eye disease: None      PMHx:   Past Medical History:   Diagnosis Date     Graves disease      HTN (hypertension)        PSHx:   Past Surgical History:   Procedure Laterality Date     COLONOSCOPY  10-    adenoma polyps x 2     EXCISE LESION CONJUNCTIVAL Left 11/15/2022    Procedure: LEFT EYE EXCISION, LESION, CONJUNCTIVA; CONJUNCTIVAL AUTOGRAFT; AMNIOTIC MEMBRANE TRANSPLANT; MITOMYCIN C (0.02%) - LEFT EYE;  Surgeon: El Purcell " MD Kim;  Location: [facility] OR     OPTICAL TRACKING SYSTEM ORBITOTOMY Left 5/11/2022    Procedure: Left orbital decompression and Sonopet Left Eye;  Surgeon: Marko Ledezma MD;  Location: [facility] OR     PROSTATE CANCER SURGERY       SONOPET EYE Left 5/11/2022    Procedure: SONOPET EYE;  Surgeon: Marko Ledezma MD;  Location: [facility] OR       Gtts Currenly Taking:  - Artificial Tears PRN  - Finished prednisolone eyedrop as instructed.     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 Not having diplopia unless he tries to bring it out in extreme positions of gaze.   - Done with prednisolone drops.   - Diplopia resolved  - okay to follow-up with Dr. Carrillo in 6 months.    #Ocular hypertension OU  - baseline RNFL OCT OU    #Cataracts OU  - Non-visually significant - monitor    Follow up:  PRN to cornea clinic.     ALSO SEES PROVIDERS:  Dr. Gerardo Garcia MD  Ophthalmology, PGY-3    Attending Physician Attestation:  Complete documentation of historical and exam elements from today's encounter can be found in the full encounter summary report (not reduplicated in this progress note).  I personally obtained the chief complaint(s) and history of present illness.  I confirmed and edited as necessary the review of systems, past medical/surgical history, family history, social history, and examination findings as documented by others; and I examined the patient myself.  I personally reviewed the relevant tests, images, and reports as documented above.  I formulated and edited as necessary the assessment and plan and discussed the findings and management plan with the patient and family. I personally reviewed the ophthalmic test(s) associated with this encounter, agree with the interpretation(s) as documented by the resident/fellow, and have edited the corresponding report(s)  as necessary. - El Purcell MD      I personally spent great than 40minutes spent on the date of the encounter doing chart review, history and exam, discussion with the patient, documentation, and further activities as noted above. We discussed the complexity of their diagnosis, the need for further information, close monitoring, continued management, and the unknown prognosis for the patient at this time.    El Purcell MD

## 2023-02-15 NOTE — NURSING NOTE
Chief Complaints and History of Present Illnesses   Patient presents with     Follow Up     Symblepharon of left eye      Chief Complaint(s) and History of Present Illness(es)     Follow Up            Laterality: left eye    Pain scale: 0/10    Comments: Symblepharon of left eye           Comments    Pt states stable vision since last visit.   No changes to note.     Gtts Currenly Taking:  Artificial Tears GRETA WAGGONER February 15, 2023 10:21 AM

## 2023-02-17 ENCOUNTER — OFFICE VISIT (OUTPATIENT)
Dept: FAMILY MEDICINE | Facility: OTHER | Age: 63
End: 2023-02-17
Attending: FAMILY MEDICINE
Payer: COMMERCIAL

## 2023-02-17 VITALS
OXYGEN SATURATION: 99 % | TEMPERATURE: 97.3 F | BODY MASS INDEX: 36.71 KG/M2 | HEART RATE: 78 BPM | HEIGHT: 73 IN | DIASTOLIC BLOOD PRESSURE: 80 MMHG | WEIGHT: 277 LBS | SYSTOLIC BLOOD PRESSURE: 128 MMHG

## 2023-02-17 DIAGNOSIS — I48.91 ATRIAL FIBRILLATION, UNSPECIFIED TYPE (H): Chronic | ICD-10-CM

## 2023-02-17 DIAGNOSIS — N18.31 CHRONIC KIDNEY DISEASE, STAGE 3A (H): ICD-10-CM

## 2023-02-17 DIAGNOSIS — N18.30 STAGE 3 CHRONIC KIDNEY DISEASE, UNSPECIFIED WHETHER STAGE 3A OR 3B CKD (H): Primary | ICD-10-CM

## 2023-02-17 DIAGNOSIS — I10 PRIMARY HYPERTENSION: Chronic | ICD-10-CM

## 2023-02-17 LAB
ANION GAP SERPL CALCULATED.3IONS-SCNC: 8 MMOL/L (ref 7–15)
BUN SERPL-MCNC: 16.6 MG/DL (ref 8–23)
CALCIUM SERPL-MCNC: 9.8 MG/DL (ref 8.8–10.2)
CHLORIDE SERPL-SCNC: 104 MMOL/L (ref 98–107)
CREAT SERPL-MCNC: 1.39 MG/DL (ref 0.67–1.17)
DEPRECATED HCO3 PLAS-SCNC: 28 MMOL/L (ref 22–29)
GFR SERPL CREATININE-BSD FRML MDRD: 57 ML/MIN/1.73M2
GLUCOSE SERPL-MCNC: 88 MG/DL (ref 70–99)
POTASSIUM SERPL-SCNC: 4.3 MMOL/L (ref 3.4–5.3)
SODIUM SERPL-SCNC: 140 MMOL/L (ref 136–145)

## 2023-02-17 PROCEDURE — 99213 OFFICE O/P EST LOW 20 MIN: CPT | Performed by: FAMILY MEDICINE

## 2023-02-17 PROCEDURE — 36415 COLL VENOUS BLD VENIPUNCTURE: CPT | Performed by: FAMILY MEDICINE

## 2023-02-17 PROCEDURE — 80048 BASIC METABOLIC PNL TOTAL CA: CPT | Performed by: FAMILY MEDICINE

## 2023-02-17 ASSESSMENT — PAIN SCALES - GENERAL: PAINLEVEL: MILD PAIN (3)

## 2023-02-17 NOTE — PROGRESS NOTES
"  Assessment & Plan     Chronic kidney disease, stage 3a (H)  Stable renal function today. US reviewed with patient - bilateral echogenicity. Unclear cause, ? Medications. [facility], [facility] has not heard from them. Will look into this further. No changes made in regimen at this time.   - Basic metabolic panel  - Basic metabolic panel     Atrial fibrillation, unspecified type (H)  Rate controlled, tolerating eliquis.     Primary hypertension  Controlled    30 minutes spent on the date of the encounter doing chart review, review of test results, interpretation of tests, patient visit and documentation     No follow-ups on file.    Rosmery Daly MD  [facility] - [place] Valverde is a 63 year old, presenting for the following health issues:    Diabetes    HPI     Hypertension Follow-up      Do you check your blood pressure regularly outside of the clinic? No     Are you following a low salt diet? No    Are your blood pressures ever more than 140 on the top number (systolic) OR more   than 90 on the bottom number (diastolic), for example 140/90? Yes    Atrial Fibrillation Follow-up      Symptoms: no recent chest pain, significant palpitations, dizziness/lightheadedness, dyspnea, or increased peripheral edema.    Stroke prevention: DOAC (Eliquis, Xarelto, Pradaxa)    Date 11/15/2022 11/15/2022 11/15/2022 1/20/2023 2/17/2023   Pulse 80 91 88 74 78     Chronic Kidney Disease Follow-up    Do you take any over the counter pain medicine?: No    Review of Systems   Constitutional, HEENT, cardiovascular, pulmonary, gi and gu systems are negative, except as otherwise noted.      Objective    /80   Pulse 78   Temp 97.3  F (36.3  C)   Ht 1.854 m (6' 1\")   Wt 125.6 kg (277 lb)   SpO2 99%   BMI 36.55 kg/m    Body mass index is 36.55 kg/m .  Physical Exam   GENERAL: alert and no distress  NECK: no adenopathy, no asymmetry, masses, or scars and thyroid " normal to palpation  RESP: lungs clear to auscultation - no rales, rhonchi or wheezes  CV: regular rates and rhythm, normal S1 S2, no S3 or S4, no murmur, click or rub and no peripheral edema  MS: no gross musculoskeletal defects noted, no edema  SKIN: no suspicious lesions or rashes    Results for orders placed or performed in visit on 02/17/23   Basic metabolic panel     Status: Abnormal   Result Value Ref Range    Sodium 140 136 - 145 mmol/L    Potassium 4.3 3.4 - 5.3 mmol/L    Chloride 104 98 - 107 mmol/L    Carbon Dioxide (CO2) 28 22 - 29 mmol/L    Anion Gap 8 7 - 15 mmol/L    Urea Nitrogen 16.6 8.0 - 23.0 mg/dL    Creatinine 1.39 (H) 0.67 - 1.17 mg/dL    Calcium 9.8 8.8 - 10.2 mg/dL    Glucose 88 70 - 99 mg/dL    GFR Estimate 57 (L) >60 mL/min/1.73m2

## 2023-02-21 PROBLEM — N18.31 CHRONIC KIDNEY DISEASE, STAGE 3A (H): Status: ACTIVE | Noted: 2023-02-21

## 2023-03-17 ENCOUNTER — MEDICAL CORRESPONDENCE (OUTPATIENT)
Dept: HEALTH INFORMATION MANAGEMENT | Facility: CLINIC | Age: 63
End: 2023-03-17

## 2023-03-24 ENCOUNTER — TELEPHONE (OUTPATIENT)
Dept: OPHTHALMOLOGY | Facility: CLINIC | Age: 63
End: 2023-03-24
Payer: COMMERCIAL

## 2023-03-24 NOTE — TELEPHONE ENCOUNTER
[facility] Call Center    Phone Message    May a detailed message be left on voicemail: yes     Reason for Call: Other: Pt requesting his records to [facility] at [phone] in order to get another authorization for continued care. Please fax asap. Thank you!    Action Taken: Message routed to:  [facility] Ophthalmolgy    Travel Screening: Not Applicable

## 2023-03-24 NOTE — TELEPHONE ENCOUNTER
medical records at [phone]       I called and LVM for Abram to contact medical records for his records     Kim Washburn Communication Facilitator on 3/24/2023 at 12:25 PM

## 2023-03-31 ENCOUNTER — TELEPHONE (OUTPATIENT)
Dept: FAMILY MEDICINE | Facility: OTHER | Age: 63
End: 2023-03-31

## 2023-03-31 NOTE — TELEPHONE ENCOUNTER
Patient called clinic stating he was on telephone call with Dr. Daly's nurse. Phone call was cut off. Patient asking for phone call back. Writer unable to see phone call placed today. Primary nurse to advise.

## 2023-04-20 NOTE — PROGRESS NOTES
Order(s) created erroneously. Erroneous order ID: [id number]   Order canceled by: CHECO SHIPMAN   Order cancel date/time: 04/20/2023 11:17 AM

## 2023-04-20 NOTE — PROGRESS NOTES
Order(s) created erroneously. Erroneous order ID: [id number]   Order canceled by: CHECO SHIPMAN   Order cancel date/time: 04/20/2023 11:18 AM

## 2023-04-20 NOTE — PROGRESS NOTES
Order(s) created erroneously. Erroneous order ID: [id number]   Order canceled by: CHECO SHIPMAN   Order cancel date/time: 04/20/2023 11:18 AM

## 2023-05-30 ENCOUNTER — OFFICE VISIT (OUTPATIENT)
Dept: FAMILY MEDICINE | Facility: OTHER | Age: 63
End: 2023-05-30
Attending: FAMILY MEDICINE
Payer: COMMERCIAL

## 2023-05-30 VITALS
DIASTOLIC BLOOD PRESSURE: 78 MMHG | OXYGEN SATURATION: 94 % | RESPIRATION RATE: 18 BRPM | HEIGHT: 73 IN | SYSTOLIC BLOOD PRESSURE: 113 MMHG | HEART RATE: 73 BPM | WEIGHT: 271 LBS | TEMPERATURE: 97.7 F | BODY MASS INDEX: 35.92 KG/M2

## 2023-05-30 DIAGNOSIS — I10 PRIMARY HYPERTENSION: Chronic | ICD-10-CM

## 2023-05-30 DIAGNOSIS — I48.91 ATRIAL FIBRILLATION, UNSPECIFIED TYPE (H): Chronic | ICD-10-CM

## 2023-05-30 DIAGNOSIS — Z85.46 HISTORY OF PROSTATE CANCER: Chronic | ICD-10-CM

## 2023-05-30 DIAGNOSIS — N18.31 CHRONIC KIDNEY DISEASE, STAGE 3A (H): ICD-10-CM

## 2023-05-30 DIAGNOSIS — E05.00 GRAVES DISEASE: Primary | Chronic | ICD-10-CM

## 2023-05-30 PROCEDURE — 99213 OFFICE O/P EST LOW 20 MIN: CPT | Performed by: FAMILY MEDICINE

## 2023-05-30 ASSESSMENT — PAIN SCALES - GENERAL: PAINLEVEL: NO PAIN (0)

## 2023-05-30 NOTE — PROGRESS NOTES
Assessment & Plan     Graves disease  [facility] and [facility] eye disease. Dose of methimazole has not changed.     Atrial fibrillation, unspecified type (H)  No bleeding, tolerating eliquis, rate controlled    Primary hypertension  Controlled on current regimen - feeling well    Chronic kidney disease, stage 3a (H)  Nephrology reviewed chart, did not see patient per [facility] protocols. Will continue to monitor.   - Albumin Random Urine Quantitative with Creat Ratio    History of prostate cancer - 2009 s/p brachytherapy  Remains on flomax. [facility] managing PSA checks - will get records.     25 minutes spent by me on the date of the encounter doing chart review, review of test results, interpretation of tests, patient visit and documentation     Return in about 3 months (around 8/30/2023) for BP Recheck, CKD3.    Rosmery Daly MD  [facility] - DENILSON Valverde is a 63 year old, presenting for the following health issues:  Follow Up        5/30/2023     4:01 PM   Additional Questions   Roomed by justo garcia   Accompanied by self     HPI     Hypertension Follow-up      Do you check your blood pressure regularly outside of the clinic? No     Are you following a low salt diet? Yes    Are your blood pressures ever more than 140 on the top number (systolic) OR more   than 90 on the bottom number (diastolic), for example 140/90? No    Chronic Kidney Disease Follow-up      Do you take any over the counter pain medicine?: No     Hyperthyroidism Follow-up      Since last visit, patient describes the following symptoms: Weight stable, no hair loss, no skin changes, no constipation, no loose stools    Review of Systems   Constitutional, HEENT, cardiovascular, pulmonary, gi and gu systems are negative, except as otherwise noted.      Objective    /78 (BP Location: Left arm, Patient Position: Sitting, Cuff Size: Adult Large)   Pulse 73   Temp 97.7  F (36.5  C) (Tympanic)    "Resp 18   Ht 1.854 m (6' 1\")   Wt 122.9 kg (271 lb)   SpO2 94%   BMI 35.75 kg/m    Body mass index is 35.75 kg/m .  Physical Exam   GENERAL: alert and no distress  NECK: no adenopathy, no asymmetry, masses, or scars and thyroid normal to palpation  RESP: lungs clear to auscultation - no rales, rhonchi or wheezes  CV: regular rates and rhythm, normal S1 S2, no S3 or S4, no murmur, click or rub and no peripheral edema  MS: no gross musculoskeletal defects noted, no edema  NEURO: Normal strength and tone, mentation intact and speech normal  PSYCH: mentation appears normal, affect normal/bright    No results found for any visits on 05/30/23.        "

## 2023-06-26 ENCOUNTER — TELEPHONE (OUTPATIENT)
Dept: OPHTHALMOLOGY | Facility: CLINIC | Age: 63
End: 2023-06-26

## 2023-06-26 NOTE — TELEPHONE ENCOUNTER
[facility] Call Center    Phone Message    May a detailed message be left on voicemail: yes     Reason for Call: Other: Patient called today and stated the [facility] is looking for a request for continuing care form (RFS) in order for them to continue covering the patients appt's. Patient states the form can be faxed to (O): [phone] when completed. Patient would also like a call back when the form has been completed and sent to the Community Care Team with the [facility]. Thank you.    Action Taken: Other: EYE     Travel Screening: Not Applicable

## 2023-07-24 NOTE — TELEPHONE ENCOUNTER
[facility] Call [facility]    Phone Message    May a detailed message be left on voicemail: yes     Reason for Call: Form or Letter     Patient Abram is calling to follow up on form for the [facility] that has been waiting, it is getting closer to appointment date and they have not receive anything. Please call patient [phone] to clarify and confirm.      Action Taken: Other: Peds Eye    Travel Screening: Not Applicable

## 2023-08-03 ENCOUNTER — TRANSCRIBE ORDERS (OUTPATIENT)
Dept: OTHER | Age: 63
End: 2023-08-03

## 2023-08-03 ENCOUNTER — TELEPHONE (OUTPATIENT)
Dept: OPHTHALMOLOGY | Facility: CLINIC | Age: 63
End: 2023-08-03
Payer: COMMERCIAL

## 2023-08-03 DIAGNOSIS — H11.232 SYMBLEPHARON OF LEFT EYE: Primary | ICD-10-CM

## 2023-08-03 NOTE — TELEPHONE ENCOUNTER
[facility] Call Center    Phone Message    May a detailed message be left on voicemail: yes     Reason for Call: Appointment Intake    Referring Provider Name: Referred by: Juvenal Rock MD  [facility] [place] [facility]   Diagnosis and/or Symptoms:     Symblepharon of left eye     Pt is being referred to Marcia Ayon for this.  It doesn't show that she sees for this in our GL's.  Please review.    Action Taken: Message routed to:  [facility] eye    Travel Screening: Not Applicable

## 2023-08-03 NOTE — TELEPHONE ENCOUNTER
Reviewed by Dr. Ayon's team    Ok to f/U in [facility] as scheduled 8-    Kapil Kumar RN 11:18 AM 08/03/23

## 2023-08-03 NOTE — TELEPHONE ENCOUNTER
This is a current patient already being seen in Dr. Carrillo's [facility] and has had surgery with Dr. Purcell for this diagnosis. The patient has an upcoming appointment on 8/21/23 with [facility]. The referral is from the [facility] so is possibly needed for continuing coverage? Does not need to be scheduled with Dr. Ayon per any notes that I can find.    Melanie Jeans, Ophthalmic Assistant

## 2023-08-03 NOTE — TELEPHONE ENCOUNTER
Pt seen with [facility] in past by Dr. 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 Marcia Ayon per referral request.    Will forward to Dr. Ayon's team to review notes/confirm scheduling once received.    Kapil Kumar RN 10:20 AM 08/03/23

## 2023-08-24 ENCOUNTER — TRANSFERRED RECORDS (OUTPATIENT)
Dept: HEALTH INFORMATION MANAGEMENT | Facility: CLINIC | Age: 63
End: 2023-08-24

## 2023-08-24 LAB
ALT SERPL-CCNC: 28 U/L (ref 13–61)
AST SERPL-CCNC: 29 U/L (ref 15–37)
CHOLESTEROL (EXTERNAL): 172 MG/DL (ref 0–200)
CREATININE (EXTERNAL): 1.5 MG/DL (ref 0.7–1.2)
GFR ESTIMATED (EXTERNAL): 52 ML/MIN/1.73M2
GLUCOSE (EXTERNAL): 107 MG/DL (ref 74–106)
HBA1C MFR BLD: 5.4 % (ref 4–6)
HDLC SERPL-MCNC: 37 MG/DL
LDL CHOLESTEROL CALCULATED (EXTERNAL): 108 MG/DL
NON HDL CHOLESTEROL (EXTERNAL): 135 MG/DL
POTASSIUM (EXTERNAL): 4.6 MMOL/L (ref 3.5–5)
TRIGLYCERIDES (EXTERNAL): 133 MG/DL
TSH SERPL-ACNC: 4.52 UIU/ML (ref 0.35–4.94)

## 2023-08-29 ENCOUNTER — OFFICE VISIT (OUTPATIENT)
Dept: FAMILY MEDICINE | Facility: OTHER | Age: 63
End: 2023-08-29
Attending: FAMILY MEDICINE
Payer: COMMERCIAL

## 2023-08-29 VITALS
BODY MASS INDEX: 35.36 KG/M2 | DIASTOLIC BLOOD PRESSURE: 80 MMHG | SYSTOLIC BLOOD PRESSURE: 110 MMHG | HEART RATE: 83 BPM | OXYGEN SATURATION: 96 % | RESPIRATION RATE: 16 BRPM | TEMPERATURE: 98.8 F | WEIGHT: 268 LBS

## 2023-08-29 DIAGNOSIS — C61 MALIGNANT NEOPLASM OF PROSTATE (H): ICD-10-CM

## 2023-08-29 DIAGNOSIS — E05.00 GRAVES DISEASE: Chronic | ICD-10-CM

## 2023-08-29 DIAGNOSIS — I10 PRIMARY HYPERTENSION: Primary | Chronic | ICD-10-CM

## 2023-08-29 DIAGNOSIS — N18.31 CHRONIC KIDNEY DISEASE, STAGE 3A (H): ICD-10-CM

## 2023-08-29 PROCEDURE — 99213 OFFICE O/P EST LOW 20 MIN: CPT | Performed by: FAMILY MEDICINE

## 2023-08-29 RX ORDER — SPIRONOLACTONE 25 MG/1
TABLET ORAL
COMMUNITY
Start: 2023-07-11 | End: 2023-08-29

## 2023-08-29 ASSESSMENT — PAIN SCALES - GENERAL: PAINLEVEL: MODERATE PAIN (5)

## 2023-08-29 NOTE — PROGRESS NOTES
Assessment & Plan     Primary hypertension  Controlled    Graves disease  Stable, [facility].     Chronic kidney disease, stage 3a (H)  Stable, labs requested. US negative. [facility] at [facility] declined referral.     Malignant neoplasm of prostate (H)  [facility], [facility] requested      25 minutes spent by me on the date of the encounter doing chart review, review of test results, interpretation of tests, patient visit, and documentation     Rosmery Daly MD  [facility] - DENILSON Valverde is a 63 year old, presenting for the following health issues:  BP Recheck        8/29/2023     3:35 PM   Additional Questions   Roomed by NADIA Mcgovern   Accompanied by self         8/29/2023     3:35 PM   Patient Reported Additional Medications   Patient reports taking the following new medications no       HPI     Hypertension Follow-up    Do you check your blood pressure regularly outside of the clinic? No   Are you following a low salt diet? Yes  Are your blood pressures ever more than 140 on the top number (systolic) OR more   than 90 on the bottom number (diastolic), for example 140/90? No    Chronic Kidney Disease Follow-up    Do you take any over the counter pain medicine?: Yes  What over the counter medicine are you taking for your pain?:  tylenol     How often do you take this medicine?:   rarely      Review of Systems   Constitutional, HEENT, cardiovascular, pulmonary, gi and gu systems are negative, except as otherwise noted.      Objective    /80 (BP Location: Left arm, Patient Position: Sitting, Cuff Size: Adult Regular)   Pulse 83   Temp 98.8  F (37.1  C) (Tympanic)   Resp 16   Wt 121.6 kg (268 lb)   SpO2 96%   BMI 35.36 kg/m    Body mass index is 35.36 kg/m .  Physical Exam   GENERAL: alert and no distress  NECK: no adenopathy, no asymmetry, masses, or scars and thyroid normal to palpation  RESP: lungs clear to auscultation - no rales, rhonchi or wheezes  CV:  regular rates and rhythm, normal S1 S2, no S3 or S4, no murmur, click or rub, and no peripheral edema  MS: no gross musculoskeletal defects noted, no edema  SKIN: no suspicious lesions or rashes  NEURO: Normal strength and tone, mentation intact and speech normal  PSYCH: mentation appears normal, affect normal/bright    No results found for any visits on 08/29/23.

## 2024-01-13 ENCOUNTER — HEALTH MAINTENANCE LETTER (OUTPATIENT)
Age: 64
End: 2024-01-13

## 2024-02-27 ENCOUNTER — HOSPITAL ENCOUNTER (EMERGENCY)
Facility: HOSPITAL | Age: 64
Discharge: HOME | End: 2024-02-27
Attending: STUDENT IN AN ORGANIZED HEALTH CARE EDUCATION/TRAINING PROGRAM | Admitting: STUDENT IN AN ORGANIZED HEALTH CARE EDUCATION/TRAINING PROGRAM
Payer: COMMERCIAL

## 2024-02-27 ENCOUNTER — APPOINTMENT (OUTPATIENT)
Dept: CT IMAGING | Facility: HOSPITAL | Age: 64
End: 2024-02-27
Attending: STUDENT IN AN ORGANIZED HEALTH CARE EDUCATION/TRAINING PROGRAM
Payer: COMMERCIAL

## 2024-02-27 VITALS
HEART RATE: 84 BPM | RESPIRATION RATE: 16 BRPM | TEMPERATURE: 97.9 F | SYSTOLIC BLOOD PRESSURE: 120 MMHG | DIASTOLIC BLOOD PRESSURE: 80 MMHG | OXYGEN SATURATION: 98 %

## 2024-02-27 DIAGNOSIS — R42 DIZZINESS: ICD-10-CM

## 2024-02-27 LAB
FLUAV RNA SPEC QL NAA+PROBE: NEGATIVE
FLUBV RNA RESP QL NAA+PROBE: NEGATIVE
RSV RNA SPEC NAA+PROBE: NEGATIVE
SARS-COV-2 RNA RESP QL NAA+PROBE: NEGATIVE

## 2024-02-27 PROCEDURE — 250N000013 HC RX MED GY IP 250 OP 250 PS 637: Performed by: STUDENT IN AN ORGANIZED HEALTH CARE EDUCATION/TRAINING PROGRAM

## 2024-02-27 PROCEDURE — 99284 EMERGENCY DEPT VISIT MOD MDM: CPT | Performed by: STUDENT IN AN ORGANIZED HEALTH CARE EDUCATION/TRAINING PROGRAM

## 2024-02-27 PROCEDURE — 99284 EMERGENCY DEPT VISIT MOD MDM: CPT | Mod: 25

## 2024-02-27 PROCEDURE — 70450 CT HEAD/BRAIN W/O DYE: CPT

## 2024-02-27 PROCEDURE — 87637 SARSCOV2&INF A&B&RSV AMP PRB: CPT | Performed by: STUDENT IN AN ORGANIZED HEALTH CARE EDUCATION/TRAINING PROGRAM

## 2024-02-27 RX ORDER — ACETAMINOPHEN 325 MG/1
650 TABLET ORAL ONCE
Status: COMPLETED | OUTPATIENT
Start: 2024-02-27 | End: 2024-02-27

## 2024-02-27 RX ADMIN — ACETAMINOPHEN 650 MG: 325 TABLET, FILM COATED ORAL at 16:31

## 2024-02-27 ASSESSMENT — ACTIVITIES OF DAILY LIVING (ADL)
ADLS_ACUITY_SCORE: 35
ADLS_ACUITY_SCORE: 35

## 2024-02-27 ASSESSMENT — COLUMBIA-SUICIDE SEVERITY RATING SCALE - C-SSRS
1. IN THE PAST MONTH, HAVE YOU WISHED YOU WERE DEAD OR WISHED YOU COULD GO TO SLEEP AND NOT WAKE UP?: NO
6. HAVE YOU EVER DONE ANYTHING, STARTED TO DO ANYTHING, OR PREPARED TO DO ANYTHING TO END YOUR LIFE?: NO
2. HAVE YOU ACTUALLY HAD ANY THOUGHTS OF KILLING YOURSELF IN THE PAST MONTH?: NO

## 2024-02-27 NOTE — DISCHARGE INSTRUCTIONS
- Please take Tylenol for your headache.  - Please return to the Emergency Room if you do not improve, feel worse, or have any new or concerning symptoms. We would especially want to see you back if you experience worsening of your headache, or if you change your mind about the other testing recommended today.  - Please follow up with a primary care physician in 4-5 days to discuss further testing or treatment

## 2024-02-27 NOTE — ED NOTES
Patient reports that since Saturday when he gets up, or bends over and stands up he had a brief moment of dizziness that last for a few seconds.  Pt stated it feels like he's spinning in a [place]. No nausea, SOB or chest pain patient is alert and ornt. Denies any recent illness'  pupils are equal and reactive.  GCS 15

## 2024-02-27 NOTE — ED TRIAGE NOTES
Patient presents with c/o dizziness/lightheadedness which started Saturday. Patient reports that he will wake up in the morning open his eyes, and every thing is spinning. Eventually spinning stops and patient is able to continue day. Patient reports position changes cause dizziness or spinning, or when turning head to fast. Denies any cold symptoms at this time.

## 2024-02-27 NOTE — ED TRIAGE NOTES
PRETTY Cummings assessed patient in triage and determined patient not Urgent Care appropriate. Will be seen in Emergency Department.

## 2024-02-27 NOTE — ED PROVIDER NOTES
History     Chief Complaint   Patient presents with    Dizziness     HPI  Jude Zazueta is a 64 year old male who reports a dizziness for the last 3-4 days when he stands up quickly, wakes up in the morning, bends over and then stands up, or possibly when he turns his head quickly. He does not currently feel dizzy. Seems to happen as many as 5-10 times a day. Also has a mild right sided headache for around 3-4 days. Thought he might have sinus pressure/headache as he has sinus issues but has been taking increased ceterizine to help with this. No changes in hearing. NO changes in vision. No SOB/CP. No weakness or changes in sensation in arms/legs.     Allergies:  Allergies   Allergen Reactions    Iodinated Contrast Media [Iodinated Contrast Media]      CHILLS    Lisinopril Cough    Nifedipine Swelling     Ankle swelling    Pollen Extract        Problem List:    Patient Active Problem List    Diagnosis Date Noted    Chronic kidney disease, stage 3a (H) 02/21/2023     Priority: Medium    Morbid obesity (H) 01/20/2023     Priority: Medium    Adverse effect of contrast media, subsequent encounter 01/20/2023     Priority: Medium    Malignant neoplasm of prostate (H) 01/20/2023     Priority: Medium    Symblepharon of left eye 11/04/2022     Priority: Medium     Added automatically from request for surgery [id number]      Diplopia 11/04/2022     Priority: Medium     Added automatically from request for surgery [id number]      History of prostate cancer - 2009 s/p brachytherapy 05/03/2022     Priority: Medium    Atrial fibrillation, unspecified type (H) 02/09/2022     Priority: Medium    Gastroesophageal reflux disease with esophagitis, unspecified whether hemorrhage 10/08/2021     Priority: Medium    Graves disease      Priority: Medium    HTN (hypertension)      Priority: Medium    Muscle weakness 11/30/2017     Priority: Medium    Chronic pain of right knee 11/30/2017     Priority: Medium    Sinusitis, chronic 04/12/2013      Priority: Medium     Problem list name updated by automated process. Provider to review      Nasal obstruction 04/12/2013     Priority: Medium    Chronic rhinitis 04/12/2013     Priority: Medium     Allergic rhinitis on IT      Hypertrophy of nasal turbinates 04/12/2013     Priority: Medium    Deviated nasal septum 04/12/2013     Priority: Medium    Advanced care planning/counseling discussion 07/18/2012     Priority: Medium        Past Medical History:    Past Medical History:   Diagnosis Date    Graves disease     HTN (hypertension)        Past Surgical History:    Past Surgical History:   Procedure Laterality Date    COLONOSCOPY  10-    adenoma polyps x 2    EXCISE LESION CONJUNCTIVAL Left 11/15/2022    Procedure: LEFT EYE EXCISION, LESION, CONJUNCTIVA; CONJUNCTIVAL AUTOGRAFT; AMNIOTIC MEMBRANE TRANSPLANT; MITOMYCIN C (0.02%) - LEFT EYE;  Surgeon: El Purcell MD;  Location: [facility] OR    OPTICAL TRACKING SYSTEM ORBITOTOMY Left 5/11/2022    Procedure: Left orbital decompression and Sonopet Left Eye;  Surgeon: Marko Ledezma MD;  Location: [facility] OR    PROSTATE CANCER SURGERY      SONOPET Left 5/11/2022    Procedure: SONOPET EYE;  Surgeon: Marko Ledezma MD;  Location: [facility] OR       Family History:    Family History   Problem Relation Age of Onset    Thyroid Disease Mother     Prostate Cancer Father     Thyroid Disease Brother     Prostate Cancer Brother     Diabetes Sister     Strabismus No family hx of     Glaucoma No family hx of     Macular Degeneration No family hx of        Social History:  Marital Status:   [2]  Social History     Tobacco Use    Smoking status: Former     Packs/day: 1.00     Years: 30.00     Additional pack years: 0.00     Total pack years: 30.00     Types: Cigarettes     Passive exposure: Past    Smokeless tobacco: Never    Tobacco comments:     Quit in 2005; no passive smoke exposure   Vaping Use    Vaping Use: Never used   Substance Use Topics    Alcohol use:  Yes     Comment: Occasionally    Drug use: No        Medications:    atorvastatin (LIPITOR) 40 MG tablet  ELIQUIS ANTICOAGULANT 5 MG tablet  furosemide (LASIX) 40 MG tablet  losartan (COZAAR) 100 MG tablet  methimazole (TAPAZOLE) 5 MG tablet  metoprolol tartrate (LOPRESSOR) 100 MG tablet  omeprazole (PRILOSEC) 20 MG DR capsule  spironolactone (ALDACTONE) 50 MG tablet  tamsulosin (FLOMAX) 0.4 MG 24 hr capsule          Review of Systems   All other systems reviewed and are negative.      Physical Exam   BP: 120/80  Pulse: 84  Temp: 97.9  F (36.6  C)  Resp: 16  SpO2: 98 %      Physical Exam  Vitals and nursing note reviewed.   Constitutional:       General: He is not in acute distress.     Appearance: Normal appearance. He is not ill-appearing.   HENT:      Head: Normocephalic and atraumatic.      Right Ear: Tympanic membrane, ear canal and external ear normal.      Left Ear: Tympanic membrane, ear canal and external ear normal.      Nose: Congestion present. No rhinorrhea.      Mouth/Throat:      Mouth: Mucous membranes are moist.   Eyes:      Extraocular Movements: Extraocular movements intact.      Pupils: Pupils are equal, round, and reactive to light.   Cardiovascular:      Rate and Rhythm: Normal rate and regular rhythm.      Pulses: Normal pulses.      Heart sounds: Normal heart sounds.   Pulmonary:      Effort: Pulmonary effort is normal.      Breath sounds: Normal breath sounds.   Abdominal:      General: Abdomen is flat.      Palpations: Abdomen is soft.   Musculoskeletal:         General: No tenderness. Normal range of motion.      Cervical back: Normal range of motion and neck supple.   Skin:     General: Skin is warm and dry.      Capillary Refill: Capillary refill takes less than 2 seconds.   Neurological:      General: No focal deficit present.      Mental Status: He is alert and oriented to person, place, and time.   Psychiatric:         Mood and Affect: Mood normal.         ED Course     Overall  work-up is entirely unremarkable. No acute findings.    Findings were discussed with the patient including non-emergent imaging/lab results. Additional verbal instructions were discussed with the patient as well. Instructed to follow up with a primary care provider within 4-5 days. Also discussed specific warning signs and instructed to return to the ED if there are any concerns. Patient voiced understanding of instructions, questions were answered and the patient was discharged home in stable condition.       Procedures                Results for orders placed or performed during the hospital encounter of 02/27/24 (from the past 24 hour(s))   Symptomatic Influenza A/B, RSV, & SARS-CoV2 PCR (COVID-19) Nasopharyngeal    Specimen: Nasopharyngeal; Swab   Result Value Ref Range    Influenza A PCR Negative Negative    Influenza B PCR Negative Negative    RSV PCR Negative Negative    SARS CoV2 PCR Negative Negative    Narrative    Testing was performed using the Xpert Xpress CoV2/Flu/RSV Assay on the Cepheid GeneXpert Instrument. This test should be ordered for the detection of SARS-CoV-2, influenza, and RSV viruses in individuals who meet clinical and/or epidemiological criteria. Test performance is unknown in asymptomatic patients. This test is for in vitro diagnostic use under the FDA EUA for laboratories certified under CLIA to perform high or moderate complexity testing. This test has not been FDA cleared or approved. A negative result does not rule out the presence of PCR inhibitors in the specimen or target RNA in concentration below the limit of detection for the assay. If only one viral target is positive but coinfection with multiple targets is suspected, the sample should be re-tested with another FDA cleared, approved, or authorized test, if coinfection would change clinical management. This test was validated by the [facility] [employer]. These laboratories are certified under the Clinical Laboratory  Improvement Amendments of 1988 (CLIA-88) as qualified to perform high complexity laboratory testing.   Head CT w/o contrast    Narrative    EXAM: CT HEAD W/O CONTRAST, 2/27/2024 4:40 PM    HISTORY: Headache    COMPARISON: CT head 9/17/2018; MR head 5/2/2022    TECHNIQUE:   Imaging protocol: Multiplanar CT images of the head without  intravenous contrast.   Acquisition: This CT exam was performed using one or more the  following dose reduction techniques: automated exposure control,  adjustment of the mA and/or kV according to patient size, and/or  iterative reconstruction technique.    FINDINGS:  No intracranial hemorrhage, mass effect, or midline shift. The  ventricles are proportionate to the cerebral sulci. No acute loss of  gray-white matter differentiation.    No acute osseous abnormality. Mild paranasal sinus mucosal thickening.  Trace left mastoid effusion. Right mastoid air cells are clear. The  orbits are grossly unremarkable.       Impression    IMPRESSION:  No acute intracranial abnormality.      SOMMER MEJIAS MD         SYSTEM ID:  [id number]       Medications   acetaminophen (TYLENOL) tablet 650 mg (650 mg Oral $Given 2/27/24 1631)       Assessments & Plan (with Medical Decision Making)     I have reviewed the nursing notes.    Do not suspect acute cardiac etiology of symptoms. Normal exam. No symptoms at present and ENT exam reassuring. May be related to medications or nasal congestion. He is otherwise well appearing. Low concern for stroke given the history and duration. No focal neurologic deficits and at present, no symptoms. Do not suspect intracranial hemorrhage. Could potentially be mass effect. Overall he is well appearing. Offered further testing after initial work-up, such as TSH, EKG, labs. However, he declined after a discussion of risks/benefits.     See ED Course.    I have reviewed the findings, diagnosis, plan and need for follow up with the patient.      New Prescriptions    No  medications on file       Final diagnoses:   Dizziness       2/27/2024   [facility]       Trell Hall MD  02/27/24 [id number]

## 2024-03-23 ENCOUNTER — HEALTH MAINTENANCE LETTER (OUTPATIENT)
Age: 64
End: 2024-03-23

## 2024-05-24 ENCOUNTER — OFFICE VISIT (OUTPATIENT)
Dept: FAMILY MEDICINE | Facility: OTHER | Age: 64
End: 2024-05-24
Attending: FAMILY MEDICINE
Payer: COMMERCIAL

## 2024-05-24 VITALS
TEMPERATURE: 98.7 F | SYSTOLIC BLOOD PRESSURE: 110 MMHG | WEIGHT: 279.2 LBS | BODY MASS INDEX: 36.84 KG/M2 | OXYGEN SATURATION: 96 % | HEART RATE: 95 BPM | DIASTOLIC BLOOD PRESSURE: 60 MMHG | RESPIRATION RATE: 18 BRPM

## 2024-05-24 DIAGNOSIS — I10 PRIMARY HYPERTENSION: Chronic | ICD-10-CM

## 2024-05-24 DIAGNOSIS — R20.2 PARESTHESIA: ICD-10-CM

## 2024-05-24 DIAGNOSIS — N18.31 CHRONIC KIDNEY DISEASE, STAGE 3A (H): ICD-10-CM

## 2024-05-24 DIAGNOSIS — E05.00 GRAVES DISEASE: Primary | ICD-10-CM

## 2024-05-24 DIAGNOSIS — I71.21 ANEURYSM OF ASCENDING AORTA WITHOUT RUPTURE (H): ICD-10-CM

## 2024-05-24 PROCEDURE — 99214 OFFICE O/P EST MOD 30 MIN: CPT | Performed by: FAMILY MEDICINE

## 2024-05-24 RX ORDER — METHYLPREDNISOLONE 32 MG/1
32 TABLET ORAL DAILY
Qty: 1 TABLET | Refills: 0 | Status: SHIPPED | OUTPATIENT
Start: 2024-05-24

## 2024-05-24 RX ORDER — AMOXICILLIN 500 MG/1
CAPSULE ORAL
COMMUNITY
Start: 2024-05-01 | End: 2024-05-24

## 2024-05-24 RX ORDER — SPIRONOLACTONE 25 MG/1
TABLET ORAL
COMMUNITY
Start: 2024-05-06 | End: 2024-05-24

## 2024-05-24 ASSESSMENT — PAIN SCALES - GENERAL: PAINLEVEL: MODERATE PAIN (5)

## 2024-05-24 NOTE — PROGRESS NOTES
Assessment & Plan     Graves disease  Has been stable per patient, will send [facility] when available.     Hypertension / Chronic kidney disease, stage 3a (H)  BP is excellent. [facility] from [facility] reviewed, generally stable. Has labs and appt upcoming, will get repeat drawn there.     Aneurysm of ascending aorta without rupture (H24)  Due for repeat CT for monitoring as well as lunch ca screening   - methylPREDNISolone (MEDROL) 32 MG tablet  Dispense: 1 tablet; Refill: 0  - methylPREDNISolone (MEDROL) 32 MG tablet  Dispense: 1 tablet; Refill: 0  - CTA Chest with Contrast    Paresthesia  Hands and feet, transient. Different extremity at a time. Started over the last month or two. Able to shake out. Usually when sitting and resting. Can be either side. Plans on having thyroid checked coming up. Will get b12 as well.   - Vitamin B12    No follow-ups on file.    Vijay Valverde is a 64 year old, presenting for the following health issues:  Follow Up        5/24/2024     3:44 PM   Additional Questions   Roomed by yusra case lpn         5/24/2024     3:44 PM   Patient Reported Additional Medications   Patient reports taking the following new medications none     History of Present Illness       CKD: He uses over the counter pain medication, including tylenol, a few times a week.    Hypertension: He presents for follow up of hypertension.  He does not check blood pressure  regularly outside of the clinic. Outpatient blood pressures have not been over 140/90. He follows a low salt diet.     Reason for visit:  Follow up    He eats 2-3 servings of fruits and vegetables daily.He consumes 0 sweetened beverage(s) daily.He exercises with enough effort to increase his heart rate 9 or less minutes per day.  He exercises with enough effort to increase his heart rate 3 or less days per week.   He is taking medications regularly.       Review of Systems  Constitutional, HEENT, cardiovascular, pulmonary, gi and gu systems are  negative, except as otherwise noted.      Objective    /60 (BP Location: Left arm, Patient Position: Sitting, Cuff Size: Adult Large)   Pulse 95   Temp 98.7  F (37.1  C) (Tympanic)   Resp 18   Wt 126.6 kg (279 lb 3.2 oz)   SpO2 96%   BMI 36.84 kg/m    Body mass index is 36.84 kg/m .  Physical Exam   GENERAL: alert and no distress  EYES: Eyes grossly normal to inspection  RESP: lungs clear to auscultation - no rales, rhonchi or wheezes  CV: regular rates and rhythm, normal S1 S2, no S3 or S4, no murmur, click or rub, and no peripheral edema  MS: no gross musculoskeletal defects noted, no edema  SKIN: no suspicious lesions or rashes    No results found for any visits on 05/24/24.        Signed Electronically by: Rosmery Daly MD

## 2024-06-10 ENCOUNTER — TELEPHONE (OUTPATIENT)
Dept: INTERVENTIONAL RADIOLOGY/VASCULAR | Facility: HOSPITAL | Age: 64
End: 2024-06-10

## 2024-06-10 RX ORDER — ONDANSETRON 2 MG/ML
4 INJECTION INTRAMUSCULAR; INTRAVENOUS
Status: CANCELLED | OUTPATIENT
Start: 2024-06-10

## 2024-06-10 RX ORDER — DIPHENHYDRAMINE HYDROCHLORIDE 50 MG/ML
25-50 INJECTION INTRAMUSCULAR; INTRAVENOUS
Status: CANCELLED | OUTPATIENT
Start: 2024-06-10

## 2024-06-10 RX ORDER — METOPROLOL TARTRATE 25 MG/1
25-100 TABLET, FILM COATED ORAL
Status: CANCELLED | OUTPATIENT
Start: 2024-06-10

## 2024-06-10 RX ORDER — METHYLPREDNISOLONE SODIUM SUCCINATE 125 MG/2ML
125 INJECTION, POWDER, LYOPHILIZED, FOR SOLUTION INTRAMUSCULAR; INTRAVENOUS
Status: CANCELLED | OUTPATIENT
Start: 2024-06-10

## 2024-06-10 RX ORDER — METOPROLOL TARTRATE 1 MG/ML
5-15 INJECTION, SOLUTION INTRAVENOUS
Status: CANCELLED | OUTPATIENT
Start: 2024-06-10

## 2024-06-10 RX ORDER — DIPHENHYDRAMINE HCL 25 MG
25 CAPSULE ORAL
Status: CANCELLED | OUTPATIENT
Start: 2024-06-10

## 2024-06-10 RX ORDER — ACYCLOVIR 200 MG/1
0-1 CAPSULE ORAL
Status: CANCELLED | OUTPATIENT
Start: 2024-06-10

## 2024-06-10 NOTE — PROVIDER NOTIFICATION
Pt stated he has the contrast prep for the CTA ordered on 6/11/2024 and was able to state the times he needs to take the medication. Pt verbalized understanding.

## 2024-06-11 ENCOUNTER — HOSPITAL ENCOUNTER (OUTPATIENT)
Dept: CT IMAGING | Facility: HOSPITAL | Age: 64
Discharge: HOME | End: 2024-06-11
Attending: FAMILY MEDICINE
Payer: COMMERCIAL

## 2024-06-11 ENCOUNTER — HOSPITAL ENCOUNTER (OUTPATIENT)
Facility: HOSPITAL | Age: 64
Discharge: HOME | End: 2024-06-11
Attending: EMERGENCY MEDICINE | Admitting: RADIOLOGY
Payer: COMMERCIAL

## 2024-06-11 DIAGNOSIS — I71.21 ANEURYSM OF ASCENDING AORTA WITHOUT RUPTURE (H): ICD-10-CM

## 2024-06-11 PROCEDURE — 250N000011 HC RX IP 250 OP 636: Performed by: RADIOLOGY

## 2024-06-11 PROCEDURE — 71275 CT ANGIOGRAPHY CHEST: CPT

## 2024-06-11 RX ORDER — IOPAMIDOL 755 MG/ML
100 INJECTION, SOLUTION INTRAVASCULAR ONCE
Status: COMPLETED | OUTPATIENT
Start: 2024-06-11 | End: 2024-06-11

## 2024-06-11 RX ADMIN — IOPAMIDOL 100 ML: 755 INJECTION, SOLUTION INTRAVENOUS at 08:08

## 2024-06-11 ASSESSMENT — ACTIVITIES OF DAILY LIVING (ADL)
ADLS_ACUITY_SCORE: 35

## 2024-06-12 ASSESSMENT — ACTIVITIES OF DAILY LIVING (ADL)
ADLS_ACUITY_SCORE: 35

## 2024-06-24 ENCOUNTER — MYC MEDICAL ADVICE (OUTPATIENT)
Dept: FAMILY MEDICINE | Facility: OTHER | Age: 64
End: 2024-06-24

## 2024-06-25 NOTE — TELEPHONE ENCOUNTER
"6/25/2024 11:12 AM  Pt reports \"I spoke to Dr. Daly last time I was in there about my right knee pain.\"   Pt denies injury, unsure on cause of knee pain. Pt scheduled as he requested. Writer did offer sooner appointment; however, pt was either out of town or working.   Iva Ayers RN    "

## 2024-07-08 ENCOUNTER — ANCILLARY PROCEDURE (OUTPATIENT)
Dept: GENERAL RADIOLOGY | Facility: OTHER | Age: 64
End: 2024-07-08
Payer: COMMERCIAL

## 2024-07-08 ENCOUNTER — OFFICE VISIT (OUTPATIENT)
Dept: FAMILY MEDICINE | Facility: OTHER | Age: 64
End: 2024-07-08
Payer: COMMERCIAL

## 2024-07-08 VITALS
BODY MASS INDEX: 36.71 KG/M2 | HEART RATE: 69 BPM | OXYGEN SATURATION: 98 % | HEIGHT: 73 IN | RESPIRATION RATE: 18 BRPM | SYSTOLIC BLOOD PRESSURE: 106 MMHG | DIASTOLIC BLOOD PRESSURE: 58 MMHG | WEIGHT: 277 LBS | TEMPERATURE: 97.5 F

## 2024-07-08 DIAGNOSIS — G89.29 CHRONIC PAIN OF RIGHT KNEE: Primary | ICD-10-CM

## 2024-07-08 DIAGNOSIS — M25.561 CHRONIC PAIN OF RIGHT KNEE: ICD-10-CM

## 2024-07-08 DIAGNOSIS — M25.561 CHRONIC PAIN OF RIGHT KNEE: Primary | ICD-10-CM

## 2024-07-08 DIAGNOSIS — G89.29 CHRONIC PAIN OF RIGHT KNEE: ICD-10-CM

## 2024-07-08 PROCEDURE — 99213 OFFICE O/P EST LOW 20 MIN: CPT

## 2024-07-08 PROCEDURE — 73562 X-RAY EXAM OF KNEE 3: CPT | Mod: TC | Performed by: RADIOLOGY

## 2024-07-08 ASSESSMENT — PAIN SCALES - GENERAL: PAINLEVEL: EXTREME PAIN (8)

## 2024-07-08 NOTE — PATIENT INSTRUCTIONS
We will let you know the xray results    I have placed a referral to [facility]. They should call to schedule.    You may use topicals creams, tylenol (up to 3000 mg daily) for pain control. I would recommend trying 650 mg tylenol every 6 to 8 hours.

## 2024-07-08 NOTE — PROGRESS NOTES
Assessment & Plan     Chronic pain of right knee  3 year history of intermittent right knee pain, slow worsening. No trauma. Xray today without acute concern or significant arthritis, there is. Patient declines PT. Ortho referral placed for discussion of options per patient request. Will defer MRI to ortho. Discussed topical analgesics, acetaminophen as needed for pain control.  - XR Knee Right 3 Views  - Orthopedic  Referral      No follow-ups on file.    Vijay Valverde is a 64 year old, presenting for the following health issues:  Musculoskeletal Problem        7/8/2024     9:21 AM   Additional Questions   Roomed by Marisol Jung     History of Present Illness       Reason for visit:  Knee pain    He eats 2-3 servings of fruits and vegetables daily.He consumes 0 sweetened beverage(s) daily.He exercises with enough effort to increase his heart rate 9 or less minutes per day.  He exercises with enough effort to increase his heart rate 5 days per week.   He is taking medications regularly.       Concern - Right knee  Onset: 3 years  Description: Right knee pain  Intensity: moderate  Progression of Symptoms:  worsening  Accompanying Signs & Symptoms: Pain,keeping up at night  Previous history of similar problem: Yes  Precipitating factors:        Worsened by: Walking, weight bearing  Alleviating factors:        Improved by: None  Therapies tried and outcome: Will have xray today    - On and off for 3 + years  - Has had injections previously, they were helpful but would ware off.   - Constant pain with ambulation, sometimes sitting as well  - No trauma or falls  - Not taking anything for pain. Ibuprofen doesn't help.   - No ice, heat, topicals  - Rates pain 8 out of 10. Describes as aching, sore.  - No swelling, redness etc.   - Working at the mines, driving equipment with right leg daily    Review of Systems  Constitutional, neuro, ENT, endocrine, pulmonary, cardiac, gastrointestinal, genitourinary,  "musculoskeletal, integument and psychiatric systems are negative, except as otherwise noted.      Objective    /58 (BP Location: Left arm, Patient Position: Sitting, Cuff Size: Adult Regular)   Pulse 69   Temp 97.5  F (36.4  C) (Tympanic)   Resp 18   Ht 1.854 m (6' 1\")   Wt 125.6 kg (277 lb)   SpO2 98%   BMI 36.55 kg/m    Body mass index is 36.55 kg/m .    Physical Exam  Constitutional:       General: He is not in acute distress.     Appearance: He is not ill-appearing.   Cardiovascular:      Rate and Rhythm: Normal rate. Rhythm irregular.      Pulses: Normal pulses.   Pulmonary:      Effort: Pulmonary effort is normal. No respiratory distress.      Breath sounds: No wheezing or rales.   Musculoskeletal:      Right knee: No swelling, deformity, effusion, erythema, ecchymosis, bony tenderness or crepitus. Decreased range of motion. Tenderness present over the medial joint line and MCL. Normal pulse.      Instability Tests: Anterior drawer test negative. Posterior drawer test negative. Medial Medardo test negative.      Left knee: Normal.   Skin:     General: Skin is warm and dry.   Neurological:      Mental Status: He is alert.          XR Knee Right 3 Views    Result Date: 7/8/2024  PROCEDURE:  XR KNEE RIGHT 3 VIEWS HISTORY: Chronic pain of right knee; Chronic pain of right knee COMPARISON:  None. TECHNIQUE:  3 views of the right knee were obtained. FINDINGS:  No fracture or dislocation is identified. The joint spaces are preserved. There is an ossified cortical defect of the proximal tibia.     IMPRESSION: No acute knee abnormality.  HOUSTON OLSON MD   SYSTEM ID:  [id number]         Signed Electronically by: MARIO Peters CNP    "

## 2024-07-19 ENCOUNTER — TRANSFERRED RECORDS (OUTPATIENT)
Dept: HEALTH INFORMATION MANAGEMENT | Facility: CLINIC | Age: 64
End: 2024-07-19
Payer: COMMERCIAL

## 2024-11-14 ENCOUNTER — TELEPHONE (OUTPATIENT)
Dept: CARE COORDINATION | Facility: OTHER | Age: 64
End: 2024-11-14

## 2024-11-14 ENCOUNTER — VIRTUAL VISIT (OUTPATIENT)
Dept: FAMILY MEDICINE | Facility: OTHER | Age: 64
End: 2024-11-14
Attending: NURSE PRACTITIONER
Payer: COMMERCIAL

## 2024-11-14 DIAGNOSIS — U07.1 INFECTION DUE TO 2019 NOVEL CORONAVIRUS: Primary | ICD-10-CM

## 2024-11-14 NOTE — TELEPHONE ENCOUNTER
"Call placed to patient to discuss positive covid 19 results.   Underlying Medical Conditions: Age  Patient testing positive on 11/13   Test by:  at home test  Start of Symptoms: 11/11    COVID-19 Symptom Review    New or worsening difficulty breathing? no  Cough? no     Dry or Productive?  NA  Fever?  No     Highest temp past 24 hours?  NA  Chills?  yes  Headache:  no  Sore throat: no  Chest pain: no  Diarrhea: yes  Body aches?  yes  Nasal congestion or drainage?  yes       Appointment Scheduled:  11/14/24 with Ling Rios    Pharmacy: [facility]        Instructions below provided to patient. Patient verbalized understanding.     Instructions for Patients  Your COVID-19 test was positive. This means you have the virus. Please follow the \"How can I take care of myself\" and \"How do I self-isolate?\" guidelines in these instructions.    What treatments are available?  Over-the-counter medicines may help with your symptoms such as runny or stuffy nose, cough, chills, and fever. Talk to your care team about your options.     Some people are at high risk for severe illness (for example if you have a weak immune system, you're 65 or older, or you have certain medical problems). If your risk it high and your symptoms started in the last 5 to 7 days, we strongly recommend for you to get COVID treatment as soon as possible before you get really sick. Paxlovid, Molnupiravir and the monoclonal antibody treatments are proven safe and effective, make you feel better faster, and prevent hospitalization and death.       What are the symptoms of COVID-19?  Symptoms can include fever, cough, shortness of breath, chills, headache, muscle pain sore throat, fatigue, runny or stuffy nose, and loss of taste and smell. Other less common symptoms include nausea, vomiting, or diarrhea (watery stools).    Know when to call 911. Emergency warning signs include:  Trouble breathing or shortness of breath  Pain or pressure in the chest that " doesn't go away  Feeling confused like you haven't felt before, or not being able to wake up  Bluish-colored lips or face    How can I take care of myself?  Get lots of rest. Drink extra fluids (unless a doctor has told you not to).  Take Tylenol (acetaminophen) for fever or pain. If you have liver or kidney problems, ask your family doctor if it's okay to take Tylenol   Adults:   650 mg (two 325 mg pills or tablets) every 4 to 6 hours, or...   1,000 mg (two 500 mg pills or tablets) every 8 hours as needed.  Note: Don't take more than 3,000 mg in one day. Acetaminophen is found in many medicines (both prescribed and over the counter). Read all labels to be sure you don't take too much.  For children, check the Tylenol bottle for the right dose. The dose is based on the child's age or weight.  Take over the counter medicines for your symptoms as needed. Talk to your pharmacist.  If you have other health problems (like cancer, heart failure, an organ transplant, or severe kidney disease): Call your specialty clinic if you don't feel better in the next 2 days.    These guidelines are for isolating and quarantining before returning to work, school or .   For employers, schools and day cares: This is an official notice for this person's medical guidelines for returning in-person.   For health care sites: The CDC gives different isolation and quarantine guidelines for healthcare sites, please check with these sites before arriving.     How do I self-isolate?  You isolate when you have symptoms of COVID or a test shows you have COVID, even if you don't have symptoms.   If you DO have symptoms:  Stay home and away from others  For at least 5 days after your symptoms started, AND   You are fever free for 24 hours (with no medicine that reduces fever), AND  Your other symptoms are better.  Wear a mask for 10 full days any time you are around others.  If you DON'T have symptoms:  Stay at home and away from others for at  least 5 days after your positive test.  Wear a mask for 10 full days any time you are around others.    How and when do I quarantine?  You quarantine when you may have been exposed to the virus and DON'T have symptoms.   Stay home and away from others.   You must quarantine for 5 days after your last contact with a person who has COVID.  Note: If you are fully vaccinated, you don't need to quarantine. You should still follow the steps below.   Wear a mask for 10 full days any time you're around others.  Get tested at least 5 days after you were exposed, even if you don't have symptoms.   If you start to have symptoms, isolate right away and get tested.    Where can I get more information?  [facility] COVID-19 Resource Hub: [email or web address]   [facility] Quarantine & Isolation: https://www.cdc.gov/coronavirus/2019-ncov/your-health/quarantine-isolation.html   [facility] - What to Do If You're Sick: https://www.cdc.gov/coronavirus/2019-ncov/if-you-are-sick/index.html  [facility] clinical trials (COVID-19 research studies): clinicalaffairs.[facility]/umn-clinical-trials  [place] Department of Health COVID-19 Public Hotline: 1-103.283.9584

## 2024-11-14 NOTE — PROGRESS NOTES
"Abram is a 64 year old who is being evaluated via a billable telephone visit.    What phone number would you like to be contacted at? [phone]  How would you like to obtain your AVS? [facility]  Originating Location (pt. Location): Home    Distant Location (provider location):  Off-site    Assessment & Plan     Infection due to 2019 novel coronavirus  New medication education completed with potential risks, benefits, administration, and potential side effects.   Reviewed medications and significant drug interactions exist with PAXLOVID. Discussed options and Abram would like to move forward with treatment using Molnupiravir.   - molnupiravir (LAGEVRIO) 200 MG capsule; Take 4 capsules (800 mg) by mouth every 12 hours for 5 days.          BMI  Estimated body mass index is 36.55 kg/m  as calculated from the following:    Height as of 7/8/24: 1.854 m (6' 1\").    Weight as of 7/8/24: 125.6 kg (277 lb).         No follow-ups on file.    Subjective   Abram is a 64 year old, presenting for the following health issues:  No chief complaint on file.        7/8/2024     9:21 AM   Additional Questions   Roomed by Marisol Jung     HPI     Underlying Medical Conditions: Age  Patient testing positive on 11/13   Test by:  at home test  Start of Symptoms: 11/11    COVID-19 Symptom Review    New or worsening difficulty breathing? no  Cough? no     Dry or Productive?  NA  Fever?  No     Highest temp past 24 hours?  NA  Chills?  yes  Headache:  no  Sore throat: no  Chest pain: no  Diarrhea: yes  Body aches?  yes  Nasal congestion or drainage?  yes       Appointment Scheduled:  11/14/24 with Ling Rios    Pharmacy: [facility]    Last Comprehensive Metabolic Panel:  Lab Results   Component Value Date     02/17/2023    POTASSIUM 4.3 02/17/2023    CHLORIDE 104 02/17/2023    CO2 28 02/17/2023    ANIONGAP 8 02/17/2023    GLC 88 02/17/2023    BUN 16.6 02/17/2023    CR 1.39 (H) 02/17/2023    GFRESTIMATED 57 (L) 02/17/2023    ADRY " "9.8 02/17/2023       Liver Function Studies - No results for input(s): \"PROTTOTAL\", \"ALBUMIN\", \"BILITOTAL\", \"ALKPHOS\", \"AST\", \"ALT\", \"BILIDIRECT\" in the last 21581 hours.     Current Outpatient Medications   Medication Sig Dispense Refill    atorvastatin (LIPITOR) 40 MG tablet 40 mg daily       ELIQUIS ANTICOAGULANT 5 MG tablet 5 mg 2 times daily       furosemide (LASIX) 40 MG tablet Take 40 mg by mouth daily       losartan (COZAAR) 100 MG tablet Take 100 mg by mouth daily      methimazole (TAPAZOLE) 5 MG tablet Take 3 tablets by mouth daily      methylPREDNISolone (MEDROL) 32 MG tablet Take 1 tablet (32 mg) by mouth daily 12 hours prior to the procedure with IV contrast (Patient not taking: Reported on 7/8/2024) 1 tablet 0    methylPREDNISolone (MEDROL) 32 MG tablet Take 1 tablet (32 mg) by mouth daily 2 hours prior to the procedure with IV contrast (Patient not taking: Reported on 7/8/2024) 1 tablet 0    metoprolol tartrate (LOPRESSOR) 100 MG tablet Take 1 tablet (100 mg) by mouth 2 times daily 180 tablet 3    omeprazole (PRILOSEC) 20 MG DR capsule Take 20 mg by mouth daily Takes as needed      spironolactone (ALDACTONE) 50 MG tablet Take 1 tablet (50 mg) by mouth daily 90 tablet 1    tamsulosin (FLOMAX) 0.4 MG 24 hr capsule Take 0.4 mg by mouth daily Takes at bedtime       No current facility-administered medications for this visit.               Objective           Vitals:  No vitals were obtained today due to virtual visit.    Physical Exam   General: Alert and no distress //Respiratory: No audible wheeze, cough, or shortness of breath // Psychiatric:  Appropriate affect, tone, and pace of words            Phone call duration: 6 minutes  Signed Electronically by: Ling Rios CNP    "

## 2025-02-18 ENCOUNTER — OFFICE VISIT (OUTPATIENT)
Dept: FAMILY MEDICINE | Facility: OTHER | Age: 65
End: 2025-02-18
Attending: FAMILY MEDICINE
Payer: COMMERCIAL

## 2025-02-18 VITALS
RESPIRATION RATE: 18 BRPM | TEMPERATURE: 97 F | HEART RATE: 69 BPM | HEIGHT: 73 IN | DIASTOLIC BLOOD PRESSURE: 65 MMHG | BODY MASS INDEX: 35.25 KG/M2 | OXYGEN SATURATION: 96 % | SYSTOLIC BLOOD PRESSURE: 97 MMHG | WEIGHT: 266 LBS

## 2025-02-18 DIAGNOSIS — N18.31 CHRONIC KIDNEY DISEASE, STAGE 3A (H): ICD-10-CM

## 2025-02-18 DIAGNOSIS — Z12.5 SCREENING FOR PROSTATE CANCER: ICD-10-CM

## 2025-02-18 DIAGNOSIS — E66.01 MORBID OBESITY (H): ICD-10-CM

## 2025-02-18 DIAGNOSIS — E78.5 HYPERLIPIDEMIA, UNSPECIFIED HYPERLIPIDEMIA TYPE: ICD-10-CM

## 2025-02-18 DIAGNOSIS — I48.91 ATRIAL FIBRILLATION, UNSPECIFIED TYPE (H): ICD-10-CM

## 2025-02-18 DIAGNOSIS — C61 MALIGNANT NEOPLASM OF PROSTATE (H): ICD-10-CM

## 2025-02-18 DIAGNOSIS — E05.00 GRAVES DISEASE: Primary | ICD-10-CM

## 2025-02-18 DIAGNOSIS — R42 DIZZINESS: ICD-10-CM

## 2025-02-18 DIAGNOSIS — I10 BENIGN ESSENTIAL HYPERTENSION: ICD-10-CM

## 2025-02-18 PROBLEM — I71.21 ANEURYSM OF ASCENDING AORTA: Status: ACTIVE | Noted: 2025-02-18

## 2025-02-18 PROBLEM — H05.20 EXOPHTHALMOS: Status: ACTIVE | Noted: 2025-02-18

## 2025-02-18 PROBLEM — E04.2 MULTINODULAR GOITER: Status: ACTIVE | Noted: 2025-02-18

## 2025-02-18 LAB
ALBUMIN SERPL BCG-MCNC: 4.4 G/DL (ref 3.5–5.2)
ALP SERPL-CCNC: 54 U/L (ref 40–150)
ALT SERPL W P-5'-P-CCNC: 17 U/L (ref 0–70)
ANION GAP SERPL CALCULATED.3IONS-SCNC: 10 MMOL/L (ref 7–15)
AST SERPL W P-5'-P-CCNC: 22 U/L (ref 0–45)
BASOPHILS # BLD AUTO: 0 10E3/UL (ref 0–0.2)
BASOPHILS NFR BLD AUTO: 1 %
BILIRUB DIRECT SERPL-MCNC: <0.08 MG/DL (ref 0–0.3)
BILIRUB SERPL-MCNC: 0.7 MG/DL
BUN SERPL-MCNC: 39.7 MG/DL (ref 8–23)
CALCIUM SERPL-MCNC: 9.5 MG/DL (ref 8.8–10.4)
CHLORIDE SERPL-SCNC: 104 MMOL/L (ref 98–107)
CHOLEST SERPL-MCNC: 153 MG/DL
CREAT SERPL-MCNC: 1.91 MG/DL (ref 0.67–1.17)
CREAT UR-MCNC: 169.2 MG/DL
EGFRCR SERPLBLD CKD-EPI 2021: 38 ML/MIN/1.73M2
EOSINOPHIL # BLD AUTO: 0.2 10E3/UL (ref 0–0.7)
EOSINOPHIL NFR BLD AUTO: 3 %
ERYTHROCYTE [DISTWIDTH] IN BLOOD BY AUTOMATED COUNT: 12 % (ref 10–15)
FASTING STATUS PATIENT QL REPORTED: YES
FASTING STATUS PATIENT QL REPORTED: YES
GLUCOSE SERPL-MCNC: 109 MG/DL (ref 70–99)
HCO3 SERPL-SCNC: 23 MMOL/L (ref 22–29)
HCT VFR BLD AUTO: 42.6 % (ref 40–53)
HDLC SERPL-MCNC: 34 MG/DL
HGB BLD-MCNC: 14.9 G/DL (ref 13.3–17.7)
IMM GRANULOCYTES # BLD: 0.1 10E3/UL
IMM GRANULOCYTES NFR BLD: 1 %
LDLC SERPL CALC-MCNC: 95 MG/DL
LYMPHOCYTES # BLD AUTO: 1.2 10E3/UL (ref 0.8–5.3)
LYMPHOCYTES NFR BLD AUTO: 18 %
MCH RBC QN AUTO: 31.8 PG (ref 26.5–33)
MCHC RBC AUTO-ENTMCNC: 35 G/DL (ref 31.5–36.5)
MCV RBC AUTO: 91 FL (ref 78–100)
MICROALBUMIN UR-MCNC: 13 MG/L
MICROALBUMIN/CREAT UR: 7.68 MG/G CR (ref 0–17)
MONOCYTES # BLD AUTO: 0.7 10E3/UL (ref 0–1.3)
MONOCYTES NFR BLD AUTO: 11 %
NEUTROPHILS # BLD AUTO: 4.2 10E3/UL (ref 1.6–8.3)
NEUTROPHILS NFR BLD AUTO: 66 %
NONHDLC SERPL-MCNC: 119 MG/DL
NRBC # BLD AUTO: 0 10E3/UL
NRBC BLD AUTO-RTO: 0 /100
PLATELET # BLD AUTO: 162 10E3/UL (ref 150–450)
POTASSIUM SERPL-SCNC: 5.1 MMOL/L (ref 3.4–5.3)
PROT SERPL-MCNC: 7.2 G/DL (ref 6.4–8.3)
PSA SERPL DL<=0.01 NG/ML-MCNC: 0.02 NG/ML (ref 0–4.5)
RBC # BLD AUTO: 4.69 10E6/UL (ref 4.4–5.9)
SODIUM SERPL-SCNC: 137 MMOL/L (ref 135–145)
TRIGL SERPL-MCNC: 122 MG/DL
WBC # BLD AUTO: 6.3 10E3/UL (ref 4–11)

## 2025-02-18 RX ORDER — SPIRONOLACTONE 25 MG/1
25 TABLET ORAL DAILY
COMMUNITY
Start: 2025-02-18

## 2025-04-12 ENCOUNTER — HEALTH MAINTENANCE LETTER (OUTPATIENT)
Age: 65
End: 2025-04-12

## 2025-05-02 ENCOUNTER — LAB (OUTPATIENT)
Dept: LAB | Facility: OTHER | Age: 65
End: 2025-05-02
Payer: COMMERCIAL

## 2025-05-02 DIAGNOSIS — I10 BENIGN ESSENTIAL HYPERTENSION: ICD-10-CM

## 2025-05-02 DIAGNOSIS — R20.2 PARESTHESIA: ICD-10-CM

## 2025-05-02 LAB
ANION GAP SERPL CALCULATED.3IONS-SCNC: 12 MMOL/L (ref 7–15)
BUN SERPL-MCNC: 28.8 MG/DL (ref 8–23)
CALCIUM SERPL-MCNC: 10 MG/DL (ref 8.8–10.4)
CHLORIDE SERPL-SCNC: 104 MMOL/L (ref 98–107)
CREAT SERPL-MCNC: 1.66 MG/DL (ref 0.67–1.17)
EGFRCR SERPLBLD CKD-EPI 2021: 45 ML/MIN/1.73M2
GLUCOSE SERPL-MCNC: 107 MG/DL (ref 70–99)
HCO3 SERPL-SCNC: 24 MMOL/L (ref 22–29)
POTASSIUM SERPL-SCNC: 4.5 MMOL/L (ref 3.4–5.3)
SODIUM SERPL-SCNC: 140 MMOL/L (ref 135–145)

## 2025-05-02 PROCEDURE — 82607 VITAMIN B-12: CPT

## 2025-05-02 PROCEDURE — 80048 BASIC METABOLIC PNL TOTAL CA: CPT

## 2025-05-02 PROCEDURE — 36415 COLL VENOUS BLD VENIPUNCTURE: CPT

## 2025-05-03 LAB — VIT B12 SERPL-MCNC: 500 PG/ML (ref 232–1245)

## 2025-07-07 ENCOUNTER — OFFICE VISIT (OUTPATIENT)
Dept: FAMILY MEDICINE | Facility: OTHER | Age: 65
End: 2025-07-07
Payer: COMMERCIAL

## 2025-07-07 VITALS
BODY MASS INDEX: 36.18 KG/M2 | HEIGHT: 73 IN | SYSTOLIC BLOOD PRESSURE: 112 MMHG | RESPIRATION RATE: 18 BRPM | WEIGHT: 273 LBS | DIASTOLIC BLOOD PRESSURE: 84 MMHG | TEMPERATURE: 98 F | HEART RATE: 67 BPM | OXYGEN SATURATION: 97 %

## 2025-07-07 DIAGNOSIS — N18.31 CHRONIC KIDNEY DISEASE, STAGE 3A (H): ICD-10-CM

## 2025-07-07 DIAGNOSIS — I10 BENIGN ESSENTIAL HYPERTENSION: Primary | ICD-10-CM

## 2025-07-07 DIAGNOSIS — Z11.59 NEED FOR HEPATITIS C SCREENING TEST: ICD-10-CM

## 2025-07-07 LAB
ANION GAP SERPL CALCULATED.3IONS-SCNC: 9 MMOL/L (ref 7–15)
BUN SERPL-MCNC: 21.9 MG/DL (ref 8–23)
CALCIUM SERPL-MCNC: 9.6 MG/DL (ref 8.8–10.4)
CHLORIDE SERPL-SCNC: 106 MMOL/L (ref 98–107)
CREAT SERPL-MCNC: 1.55 MG/DL (ref 0.67–1.17)
EGFRCR SERPLBLD CKD-EPI 2021: 49 ML/MIN/1.73M2
GLUCOSE SERPL-MCNC: 105 MG/DL (ref 70–99)
HCO3 SERPL-SCNC: 24 MMOL/L (ref 22–29)
HCV AB SERPL QL IA: NONREACTIVE
POTASSIUM SERPL-SCNC: 4.9 MMOL/L (ref 3.4–5.3)
SODIUM SERPL-SCNC: 139 MMOL/L (ref 135–145)

## 2025-07-07 ASSESSMENT — PAIN SCALES - GENERAL: PAINLEVEL_OUTOF10: MODERATE PAIN (6)

## 2025-07-07 NOTE — PROGRESS NOTES
"  Assessment & Plan     Benign essential hypertension  BP improved with decrease in aldactone, lasix. Now taking 20 mg daily of lasix. Fluid status, weight stable. No further change in regimen today.   - Basic metabolic panel    Chronic kidney disease, stage 3a (H)  Repeat BMP pending. Baseline around 1.3.     Need for hepatitis C screening test  - Hepatitis C Screen Reflex to HCV RNA Quant and Genotype        Subjective   Abram is a 65 year old, presenting for the following health issues:  Hypertension        7/7/2025    11:16 AM   Additional Questions   Roomed by Marisol Jung     History of Present Illness       Reason for visit:  NA   He is taking medications regularly.        Hypertension Follow-up    Do you check your blood pressure regularly outside of the clinic? No   Are you following a low salt diet? No  Are your blood pressures ever more than 140 on the top number (systolic) OR more   than 90 on the bottom number (diastolic), for example 140/90? Yes    - Aldactone decreased d/t soft blood pressures. Lasix decreased from 40 mg to 20 mg on 5/2.  - Dizziness with position change is improved with decrease in medication      BP Readings from Last 2 Encounters:   07/07/25 112/84   05/02/25 98/66         Review of Systems  Constitutional, neuro, ENT, endocrine, pulmonary, cardiac, gastrointestinal, genitourinary, musculoskeletal, integument and psychiatric systems are negative, except as otherwise noted.      Objective    /84 (BP Location: Left arm, Patient Position: Sitting, Cuff Size: Adult Regular)   Pulse 67   Temp 98  F (36.7  C) (Tympanic)   Resp 18   Ht 1.854 m (6' 1\")   Wt 123.8 kg (273 lb)   SpO2 97%   BMI 36.02 kg/m    Body mass index is 36.02 kg/m .    Physical Exam   GENERAL: alert and no distress  RESP: lungs clear to auscultation - no rales, rhonchi or wheezes  CV: regular rate and rhythm, normal S1 S2, no S3 or S4, no murmur, click or rub  MS: no gross musculoskeletal defects noted, " trace edema  NEURO: Normal strength and tone, mentation intact and speech normal  PSYCH: mentation appears normal, affect normal/bright          Signed Electronically by: MARIO Peters CNP

## (undated) DEVICE — ADH TISSEEL FIBRIN SEALANT 2ML

## (undated) DEVICE — SPONGE RAY-TEC 4X8" 7318

## (undated) DEVICE — TAPE MICROPORE 1"X1.5YD 1530S-1

## (undated) DEVICE — PEN MARKING SKIN TYCO DEVON DUAL TIP 31145868

## (undated) DEVICE — TUBING SET ASPIRATION W/EXT SONOPET  LF 5450-850-003

## (undated) DEVICE — GLOVE PROTEXIS MICRO 6.5  2D73PM65

## (undated) DEVICE — LINEN TOWEL PACK X5 5464

## (undated) DEVICE — ESU GROUND PAD ADULT W/CORD E7507

## (undated) DEVICE — TUBING SUCTION 12"X1/4" N612

## (undated) DEVICE — SOL WATER IRRIG 500ML BOTTLE 2F7113

## (undated) DEVICE — PACK CATARACT CUSTOM ASC SEY15CPUMC

## (undated) DEVICE — SU VICRYL 5-0 P-3 18" UND J493G

## (undated) DEVICE — Device

## (undated) DEVICE — SUCTION MANIFOLD NEPTUNE 2 SYS 4 PORT 0702-020-000

## (undated) DEVICE — BONE WAX 2.5GM W31G

## (undated) DEVICE — SU PLAIN 6-0 G-1DA 18" 770G

## (undated) DEVICE — LINER SUCTION US ASPIRATOR SONOPET CANISTER LF 5450-850-002

## (undated) DEVICE — EYE KNIFE CRESCENT 55DEG 373807

## (undated) DEVICE — EYE PREP BETADINE 5% SOLUTION 30ML 0065-0411-30

## (undated) DEVICE — EYE CANN IRR 27GA ANTERIOR CHAMBER 581280

## (undated) DEVICE — SOL NACL 0.9% INJ 1000ML BAG 2B1324X

## (undated) DEVICE — EYE SHIELD PLASTIC

## (undated) DEVICE — EYE SPONGE SPEAR WECK CEL 0008685

## (undated) DEVICE — NDL 30GA 0.5" 305106

## (undated) DEVICE — BLADE KNIFE SURG 15 371115

## (undated) DEVICE — PACK MINOR EYE CUSTOM ASC

## (undated) DEVICE — ESU NDL COLORADO MICRO 3CM STR N103A

## (undated) DEVICE — APPLICATOR COTTON TIP 3" PKG OF 10 34831010

## (undated) DEVICE — TAPE MICROPORE 2"X1.5YD 1530S-2

## (undated) DEVICE — GLOVE PROTEXIS MICRO 7.5  2D73PM75

## (undated) DEVICE — TIP ASPIRATOR PAYNER ANG SONOPET UNV 25KHZ 360D 5450-800-312

## (undated) DEVICE — GLOVE PROTEXIS MICRO 6.0  2D73PM60

## (undated) DEVICE — ESU PENCIL W/COATED BLADE E2450H

## (undated) RX ORDER — FENTANYL CITRATE 50 UG/ML
INJECTION, SOLUTION INTRAMUSCULAR; INTRAVENOUS
Status: DISPENSED
Start: 2022-11-15

## (undated) RX ORDER — LIDOCAINE HYDROCHLORIDE 10 MG/ML
INJECTION, SOLUTION EPIDURAL; INFILTRATION; INTRACAUDAL; PERINEURAL
Status: DISPENSED
Start: 2022-11-15

## (undated) RX ORDER — LIDOCAINE HYDROCHLORIDE 20 MG/ML
INJECTION, SOLUTION EPIDURAL; INFILTRATION; INTRACAUDAL; PERINEURAL
Status: DISPENSED
Start: 2022-11-15

## (undated) RX ORDER — OXYCODONE HYDROCHLORIDE 5 MG/1
TABLET ORAL
Status: DISPENSED
Start: 2022-05-11

## (undated) RX ORDER — ERYTHROMYCIN 5 MG/G
OINTMENT OPHTHALMIC
Status: DISPENSED
Start: 2022-05-11

## (undated) RX ORDER — FENTANYL CITRATE 50 UG/ML
INJECTION, SOLUTION INTRAMUSCULAR; INTRAVENOUS
Status: DISPENSED
Start: 2022-05-11

## (undated) RX ORDER — ONDANSETRON 2 MG/ML
INJECTION INTRAMUSCULAR; INTRAVENOUS
Status: DISPENSED
Start: 2022-05-11

## (undated) RX ORDER — FENTANYL CITRATE-0.9 % NACL/PF 10 MCG/ML
PLASTIC BAG, INJECTION (ML) INTRAVENOUS
Status: DISPENSED
Start: 2022-05-11

## (undated) RX ORDER — TETRACAINE HYDROCHLORIDE 5 MG/ML
SOLUTION OPHTHALMIC
Status: DISPENSED
Start: 2022-05-11

## (undated) RX ORDER — LIDOCAINE HYDROCHLORIDE AND EPINEPHRINE 10; 10 MG/ML; UG/ML
INJECTION, SOLUTION INFILTRATION; PERINEURAL
Status: DISPENSED
Start: 2022-05-11

## (undated) RX ORDER — PROPOFOL 10 MG/ML
INJECTION, EMULSION INTRAVENOUS
Status: DISPENSED
Start: 2022-11-15

## (undated) RX ORDER — ACETAMINOPHEN 325 MG/1
TABLET ORAL
Status: DISPENSED
Start: 2022-11-15

## (undated) RX ORDER — LIDOCAINE HYDROCHLORIDE 20 MG/ML
INJECTION, SOLUTION EPIDURAL; INFILTRATION; INTRACAUDAL; PERINEURAL
Status: DISPENSED
Start: 2022-05-11

## (undated) RX ORDER — GLYCOPYRROLATE 0.2 MG/ML
INJECTION INTRAMUSCULAR; INTRAVENOUS
Status: DISPENSED
Start: 2022-11-15

## (undated) RX ORDER — DEXAMETHASONE SODIUM PHOSPHATE 10 MG/ML
INJECTION, SOLUTION INTRAMUSCULAR; INTRAVENOUS
Status: DISPENSED
Start: 2022-05-11

## (undated) RX ORDER — PROPOFOL 10 MG/ML
INJECTION, EMULSION INTRAVENOUS
Status: DISPENSED
Start: 2022-05-11